# Patient Record
Sex: MALE | Race: WHITE | Employment: UNEMPLOYED | URBAN - METROPOLITAN AREA
[De-identification: names, ages, dates, MRNs, and addresses within clinical notes are randomized per-mention and may not be internally consistent; named-entity substitution may affect disease eponyms.]

---

## 2017-02-11 RX ORDER — TRAZODONE HYDROCHLORIDE 50 MG/1
TABLET ORAL
Qty: 135 TAB | Refills: 3 | Status: SHIPPED | OUTPATIENT
Start: 2017-02-11 | End: 2018-01-29 | Stop reason: SDUPTHER

## 2017-06-17 DIAGNOSIS — I10 BENIGN ESSENTIAL HYPERTENSION: ICD-10-CM

## 2017-06-17 RX ORDER — METOPROLOL SUCCINATE 100 MG/1
TABLET, EXTENDED RELEASE ORAL
Qty: 135 TAB | Refills: 3 | Status: SHIPPED | OUTPATIENT
Start: 2017-06-17 | End: 2018-06-30 | Stop reason: SDUPTHER

## 2017-08-21 ENCOUNTER — OFFICE VISIT (OUTPATIENT)
Dept: FAMILY MEDICINE CLINIC | Age: 57
End: 2017-08-21

## 2017-08-21 VITALS
HEIGHT: 73 IN | HEART RATE: 90 BPM | OXYGEN SATURATION: 97 % | DIASTOLIC BLOOD PRESSURE: 82 MMHG | WEIGHT: 228 LBS | BODY MASS INDEX: 30.22 KG/M2 | TEMPERATURE: 99.3 F | RESPIRATION RATE: 18 BRPM | SYSTOLIC BLOOD PRESSURE: 132 MMHG

## 2017-08-21 DIAGNOSIS — Z12.5 SCREENING PSA (PROSTATE SPECIFIC ANTIGEN): ICD-10-CM

## 2017-08-21 DIAGNOSIS — Z51.81 ENCOUNTER FOR MEDICATION MONITORING: ICD-10-CM

## 2017-08-21 DIAGNOSIS — I10 BENIGN ESSENTIAL HYPERTENSION: Primary | ICD-10-CM

## 2017-08-21 DIAGNOSIS — E78.00 HYPERCHOLESTEROLEMIA: ICD-10-CM

## 2017-08-21 DIAGNOSIS — Z79.899 CONTROLLED SUBSTANCE AGREEMENT SIGNED: ICD-10-CM

## 2017-08-21 DIAGNOSIS — R73.03 PRE-DIABETES: ICD-10-CM

## 2017-08-21 DIAGNOSIS — F41.9 ANXIETY: ICD-10-CM

## 2017-08-21 DIAGNOSIS — R25.1 TREMOR: ICD-10-CM

## 2017-08-21 RX ORDER — CLONAZEPAM 0.5 MG/1
TABLET ORAL
Qty: 30 TAB | Refills: 5 | Status: SHIPPED | OUTPATIENT
Start: 2017-08-21 | End: 2018-05-29 | Stop reason: SDUPTHER

## 2017-08-21 RX ORDER — POLYETHYLENE GLYCOL-3350 AND ELECTROLYTES WITH FLAVOR PACK 240; 5.84; 2.98; 6.72; 22.72 G/278.26G; G/278.26G; G/278.26G; G/278.26G; G/278.26G
POWDER, FOR SOLUTION ORAL
Refills: 0 | COMMUNITY
Start: 2017-08-10 | End: 2017-08-21

## 2017-08-21 NOTE — PATIENT INSTRUCTIONS
Prediabetes: Care Instructions  Your Care Instructions  Prediabetes is a warning sign that you are at risk for getting type 2 diabetes. It means that your blood sugar is higher than it should be. The food you eat turns into sugar, which your body uses for energy. Normally, an organ called the pancreas makes insulin, which allows the sugar in your blood to get into your body's cells. But when your body can't use insulin the right way, the sugar doesn't move into cells. It stays in your blood instead. This is called insulin resistance. The buildup of sugar in the blood causes prediabetes. The good news is that lifestyle changes may help you get your blood sugar back to normal and help you avoid or delay diabetes. Follow-up care is a key part of your treatment and safety. Be sure to make and go to all appointments, and call your doctor if you are having problems. It's also a good idea to know your test results and keep a list of the medicines you take. How can you care for yourself at home? · Watch your weight. A healthy weight helps your body use insulin properly. · Limit the amount of calories, sweets, and unhealthy fat you eat. Ask your doctor if you should see a dietitian. A registered dietitian can help you create meal plans that fit your lifestyle. · Get at least 30 minutes of exercise on most days of the week. Exercise helps control your blood sugar. It also helps you maintain a healthy weight. Walking is a good choice. You also may want to do other activities, such as running, swimming, cycling, or playing tennis or team sports. · Do not smoke. Smoking can make prediabetes worse. If you need help quitting, talk to your doctor about stop-smoking programs and medicines. These can increase your chances of quitting for good. · If your doctor prescribed medicines, take them exactly as prescribed. Call your doctor if you think you are having a problem with your medicine.  You will get more details on the specific medicines your doctor prescribes. When should you call for help? Watch closely for changes in your health, and be sure to contact your doctor if:  · You have any symptoms of diabetes. These may include:  ¨ Being thirsty more often. ¨ Urinating more. ¨ Being hungrier. ¨ Losing weight. ¨ Being very tired. ¨ Having blurry vision. · You have a wound that will not heal.  · You have an infection that will not go away. · You have problems with your blood pressure. · You want more information about diabetes and how you can keep from getting it. Where can you learn more? Go to http://santos-rob.info/. Enter I222 in the search box to learn more about \"Prediabetes: Care Instructions. \"  Current as of: March 13, 2017  Content Version: 11.3  © 8183-6030 Healthwise, Incorporated. Care instructions adapted under license by [employer] (which disclaims liability or warranty for this information). If you have questions about a medical condition or this instruction, always ask your healthcare professional. [address] any warranty or liability for your use of this information.

## 2017-08-21 NOTE — MR AVS SNAPSHOT
Visit Information Date & Time Provider Department Dept. Phone Encounter #  
 8/21/2017  8:00 AM Jose Davis, [address] [phone] [id number] Upcoming Health Maintenance Date Due INFLUENZA AGE 9 TO ADULT 8/1/2017 COLONOSCOPY 3/25/2020 DTaP/Tdap/Td series (2 - Td) 7/22/2026 Allergies as of 8/21/2017  Review Complete On: 8/21/2017 By: Jose Davis MD  
 No Known Allergies Current Immunizations  Reviewed on 8/2/2016 Name Date  
 TD Vaccine 1/1/2007 Tdap 7/22/2016 Not reviewed this visit You Were Diagnosed With   
  
 Codes Comments Benign essential hypertension    -  Primary ICD-10-CM: I10 
ICD-9-CM: 401.1 Hypercholesterolemia     ICD-10-CM: E78.00 ICD-9-CM: 272.0 Pre-diabetes     ICD-10-CM: R73.03 
ICD-9-CM: 790.29 Anxiety     ICD-10-CM: F41.9 ICD-9-CM: 300.00 Tremor     ICD-10-CM: R25.1 ICD-9-CM: 449. 0 Vitals BP Pulse Temp Resp Height(growth percentile) Weight(growth percentile) 132/82 (BP 1 Location: Right arm, BP Patient Position: Sitting) 90 99.3 °F (37.4 °C) (Oral) 18 6' 1\" (1.854 m) 228 lb (103.4 kg) SpO2 BMI Smoking Status 97% 30.08 kg/m2 Former Smoker BMI and BSA Data Body Mass Index Body Surface Area 30.08 kg/m 2 2.31 m 2 Preferred Pharmacy Pharmacy Name Phone [phone] [address] [phone] Your Updated Medication List  
  
   
This list is accurate as of: 8/21/17  8:49 AM.  Always use your most recent med list. amLODIPine 10 mg tablet Commonly known as:  Tommie Matar TAKE 1 TABLET BY MOUTH EVERY DAY  
  
 clonazePAM 0.5 mg tablet Commonly known as:  KlonoPIN  
TAKE 1 TABLET AT BEDTIME AS NEEDED  
  
 lisinopril-hydroCHLOROthiazide 20-25 mg per tablet Commonly known as:  PRINZIDE, ZESTORETIC  
TAKE 2 TABLETS BY MOUTH EVERY DAY  
  
 metoprolol succinate 100 mg tablet Commonly known as:  TOPROL-XL  
TAKE 1 AND 1/2 TABLETS BY MOUTH EVERY DAY  
  
 MULTIVITAMIN PO Take  by mouth daily. traZODone 50 mg tablet Commonly known as:  DESYREL  
TAKE 1-1 AND 1/2 TABLETS BY MOUTH NIGHTLY Prescriptions Printed Refills  
 clonazePAM (KLONOPIN) 0.5 mg tablet 5 Sig: TAKE 1 TABLET AT BEDTIME AS NEEDED Class: Print Patient Instructions Prediabetes: Care Instructions Your Care Instructions Prediabetes is a warning sign that you are at risk for getting type 2 diabetes. It means that your blood sugar is higher than it should be. The food you eat turns into sugar, which your body uses for energy. Normally, an organ called the pancreas makes insulin, which allows the sugar in your blood to get into your body's cells. But when your body can't use insulin the right way, the sugar doesn't move into cells. It stays in your blood instead. This is called insulin resistance. The buildup of sugar in the blood causes prediabetes. The good news is that lifestyle changes may help you get your blood sugar back to normal and help you avoid or delay diabetes. Follow-up care is a key part of your treatment and safety. Be sure to make and go to all appointments, and call your doctor if you are having problems. It's also a good idea to know your test results and keep a list of the medicines you take. How can you care for yourself at home? · Watch your weight. A healthy weight helps your body use insulin properly. · Limit the amount of calories, sweets, and unhealthy fat you eat. Ask your doctor if you should see a dietitian. A registered dietitian can help you create meal plans that fit your lifestyle. · Get at least 30 minutes of exercise on most days of the week. Exercise helps control your blood sugar. It also helps you maintain a healthy weight. Walking is a good choice.  You also may want to do other activities, such as running, swimming, cycling, or playing tennis or team sports. · Do not smoke. Smoking can make prediabetes worse. If you need help quitting, talk to your doctor about stop-smoking programs and medicines. These can increase your chances of quitting for good. · If your doctor prescribed medicines, take them exactly as prescribed. Call your doctor if you think you are having a problem with your medicine. You will get more details on the specific medicines your doctor prescribes. When should you call for help? Watch closely for changes in your health, and be sure to contact your doctor if: 
· You have any symptoms of diabetes. These may include: ¨ Being thirsty more often. ¨ Urinating more. ¨ Being hungrier. ¨ Losing weight. ¨ Being very tired. ¨ Having blurry vision. · You have a wound that will not heal. 
· You have an infection that will not go away. · You have problems with your blood pressure. · You want more information about diabetes and how you can keep from getting it. Where can you learn more? Go to http://santos-rob.info/. Enter I222 in the search box to learn more about \"Prediabetes: Care Instructions. \" Current as of: March 13, 2017 Content Version: 11.3 © 2575-7820 [employer], Incorporated. Care instructions adapted under license by [employer] (which disclaims liability or warranty for this information). If you have questions about a medical condition or this instruction, always ask your healthcare professional. [address] any warranty or liability for your use of this information. Introducing [facility]! Dear Adalgisa Repress: Thank you for requesting a [employer] account. Our records indicate that you already have an active [employer] account. You can access your account anytime at [email or web address]. Major Aide/[employer] Did you know that you can access your hospital and ER discharge instructions at any time in [employer]? You can also review all of your test results from your hospital stay or ER visit. Additional Information If you have questions, please visit the Frequently Asked Questions section of the [employer] website at [email or web address] Remember, [employer] is NOT to be used for urgent needs. For medical emergencies, dial 911. Now available from your iPhone and Android! Please provide this summary of care documentation to your next provider. Your primary care clinician is listed as SHAWN BRANDT. If you have any questions after today's visit, please call [phone].

## 2017-08-21 NOTE — PROGRESS NOTES
Chief Complaint   Patient presents with    Hypertension     fasting follow up    Diabetes    Cholesterol Problem     1. Have you been to the ER, urgent care clinic since your last visit? Hospitalized since your last visit? No    2. Have you seen or consulted any other health care providers outside of the [facility] since your last visit? Include any pap smears or colon screening.    Dentist Dr Malia Escudero   Having colonoscopy in four days with [facility]

## 2017-08-21 NOTE — PROGRESS NOTES
HISTORY OF PRESENT ILLNESS   HPI  Fasting follow up hypercholesterolemia, hypertension, prediabetes, labs and medication check. Overall has been getting along well and feeling well in general  Tolerating current medication regimen w/o reaction or side effects. Takes Trazodone qhs for sleep which does help most nights. Some nights he still struggles w/ it. He takes Klonopin 1 tablet ~ 3-4 days a week for anxiety or sleep. He vacationed in [place] w/ his family this summer for a few weeks in June/early July. States he was constantly on the go and sometimes would go long stents w/o eating or drinking. He fainted twice while w/ his family. He didn't seek medical attention and has felt perfectly fine since. REVIEW OF SYMPTOMS     Review of Systems   Constitutional: Negative. Respiratory: Negative. Cardiovascular: Negative. Gastrointestinal: Negative.     Neurological: Negative for dizziness.         PROBLEM LIST/MEDICAL HISTORY      Problem List  Date Reviewed: 8/21/2017          Codes Class Noted    Controlled substance agreement signed ICD-10-CM: Z79.899  ICD-9-CM: V58.69  8/21/2017    Overview Signed 8/21/2017  8:52 AM by Chidi Orlando MD     8-21-17             Benign essential hypertension ICD-10-CM: I10  ICD-9-CM: 401.1  10/20/2016        Hypercholesterolemia ICD-10-CM: E78.00  ICD-9-CM: 272.0  10/20/2016        Pre-diabetes ICD-10-CM: R73.03  ICD-9-CM: 790.29  7/7/2013    Overview Signed 7/7/2013 10:54 PM by Chidi Orlando MD     2009             Left Plantar fasciitis ICD-10-CM: M72.2  ICD-9-CM: 728.71  7/20/2012    Overview Signed 7/20/2012 11:57 AM by Chidi Orlando MD     [facility], Ortho ~ 2006; 4/2012             White coat syndrome ICD-10-CM: [id number]  ICD-9-CM: [place]  7/20/2012        Elevated liver enzymes ICD-10-CM: R74.8  ICD-9-CM: 790.5  7/20/2012    Overview Signed 7/20/2012  2:09 PM by Chidi Orlando MD     R/t alcohol             Sinus tachycardia ICD-10-CM: R00.0  ICD-9-CM: 427.89  9/19/2011        Rectal polyp & Internal Hemorrhoids 2010 ICD-10-CM: K62.1  ICD-9-CM: 569.0  11/18/2010    Overview Signed 11/18/2010  1:25 PM by Zak Mckeon MD     Colonoscopy 3/2010 Dr Sharifa Leyva; f/u 5 yrs             Alcoholism [facility]) ICD-10-CM: F10.20  ICD-9-CM: 303.90  Unknown    Overview Addendum 7/20/2012 12:19 PM by Zak Mckeon MD     [facility] clinical trials             Anxiety ICD-10-CM: F41.9  ICD-9-CM: 300.00  Unknown        Tremor ICD-10-CM: R25.1  ICD-9-CM: 781.0  Unknown        Concentric LVH (left ventricular hypertrophy) ICD-10-CM: I51.7  ICD-9-CM: 429.3  Unknown    Overview Addendum 11/18/2010  1:23 PM by Zak Mckeon MD     May 2008 EF 60%; Stress tests normal 1997, 2003             H/O Hepatitis C, s/p Tx ICD-10-CM: B19.20  ICD-9-CM: 070.70  Unknown    Overview Addendum 7/20/2012  2:08 PM by Zak Mckeon MD     1997; s/p Interferon and Shira Dereck; Dr Sharifa Leyva; Liver biopsy 1998 for staging             Genital HSV ICD-10-CM: A60.00  ICD-9-CM: 054.10  4/1/2010    Overview Signed 11/18/2010  1:22 PM by Zak Mckeon MD     1988             Situational stress ICD-10-CM: F43.9  ICD-9-CM: V62.89  4/1/2010    Overview Signed 11/18/2010  1:23 PM by Zak Mckeon MD     2005             S/P colonoscopy with polypectomy ICD-10-CM: Z46.806  ICD-9-CM: V45.89  3/15/2010    Overview Signed 4/20/2010  9:47 PM by Zak Mckeon MD     Rectal polyp & internal Hemorrhoids; Dr Sharifa Leyva; ok x 5yrs                       PAST SURGICAL HISTORY       Past Surgical History:   Procedure Laterality Date    COLONOSCOPY  3/2010    -rectal polyp and intern hemorr-f/u in 5 years    [facility]    HX GI  3/1998    Liver biopsy, Dr Cristina Montero  childhood    BILATERAL     HX ORTHOPAEDIC  1/24/14    Edwina Reese resulting in Left hip and pelvic fracture, repair w/ screws;   [facility] 2  2003    STRESS TEST CARDIAC  2003    Negative ETT         MEDICATIONS      Current Outpatient Prescriptions   Medication Sig    clonazePAM (KLONOPIN) 0.5 mg tablet TAKE 1 TABLET AT BEDTIME AS NEEDED    metoprolol succinate (TOPROL-XL) 100 mg tablet TAKE 1 AND 1/2 TABLETS BY MOUTH EVERY DAY    traZODone (DESYREL) 50 mg tablet TAKE 1-1 AND 1/2 TABLETS BY MOUTH NIGHTLY    amLODIPine (NORVASC) 10 mg tablet TAKE 1 TABLET BY MOUTH EVERY DAY    lisinopril-hydrochlorothiazide (PRINZIDE, ZESTORETIC) 20-25 mg per tablet TAKE 2 TABLETS BY MOUTH EVERY DAY    MULTIVITAMINS (MULTIVITAMIN PO) Take  by mouth daily. No current facility-administered medications for this visit.            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     Laid off [employer] more recently    Chandan Rivera; : Started new job at [employer]      Social History Main Topics    Smoking status: Former Smoker     Packs/day: 0.50     Years: 10.00     Types: Cigarettes     Quit date: 1/1/2004    Smokeless tobacco: Never Used    Alcohol use 22.5 oz/week     45 Cans of beer per week      Comment: 6-12 cans a beer a day at present; chronic alcoholism, has participated [facility] clinical trials [facility]; relapses freq; ranges from 2 cans to 12-15 beers a day, when heavier can get up to 20beers in a day; still battling w/ this    Drug use: No    Sexual activity: Yes     Partners: Female     Other Topics Concern    Caffeine Concern No     rare, just an occ diet coke    Stress Concern No    Weight Concern No    Special Diet Yes     low sodium, low fat, lots of salads, avoids fried foods    Exercise No     just trying to stay active     Social History Narrative    IMMUNIZATIONS  Immunization History   Administered Date(s) Administered    TD Vaccine 01/01/2007    Tdap 07/22/2016         FAMILY HISTORY     Family History   Problem Relation Age of Onset    Diabetes Paternal Grandmother     Heart Disease Paternal Grandfather     Hypertension Brother 50    Colon Polyps Mother 78    Breast Cancer Maternal Aunt          VITALS     Visit Vitals    /82 (BP 1 Location: Right arm, BP Patient Position: Sitting)    Pulse 90    Temp 99.3 °F (37.4 °C) (Oral)    Resp 18    Ht 6' 1\" (1.854 m)    Wt 228 lb (103.4 kg)    SpO2 97%    BMI 30.08 kg/m2          PHYSICAL EXAMINATION     Physical Exam   Constitutional: He is oriented to person, place, and time. No distress. Neck: Neck supple. No JVD present. Carotid bruit is not present. No thyromegaly present. Cardiovascular: Normal rate, regular rhythm and normal heart sounds. Pulmonary/Chest: Effort normal and breath sounds normal.   Abdominal: Soft. Bowel sounds are normal. There is no tenderness. Musculoskeletal: He exhibits no edema. Lymphadenopathy:     He has no cervical adenopathy. Neurological: He is alert and oriented to person, place, and time. Coordination normal.   Skin: Skin is warm. Psychiatric: Mood, affect and judgment normal.   Vitals reviewed.              ASSESSMENT & PLAN       ICD-10-CM ICD-9-CM    1. Benign essential hypertension I10 401.1 CBC WITH AUTOMATED DIFF      METABOLIC PANEL, COMPREHENSIVE      TSH 3RD GENERATION      HEMOGLOBIN A1C WITH EAG      URINALYSIS W/ RFLX MICROSCOPIC   2. Hypercholesterolemia E78.00 272.0 LIPID PANEL      TSH 3RD GENERATION   3. Pre-diabetes Z13.45 037.16 METABOLIC PANEL, COMPREHENSIVE      HEMOGLOBIN A1C WITH EAG   4. Anxiety F41.9 300.00 clonazePAM (KLONOPIN) 0.5 mg tablet   5. Tremor R25.1 781.0 clonazePAM (KLONOPIN) 0.5 mg tablet   6. Encounter for medication monitoring Z51.81 V58.83    7.  Controlled substance agreement signed Z79.899 V58.69 8. Screening PSA (prostate specific antigen) Z12.5 V76.44 PSA SCREENING     Fasting labs today    Reviewed diet, nutrition, exercise and weight control    Cardiovascular risk and specific lipid/LDL goals reviewed    Reviewed medications and side effects in detail      pulled and reviewed. No problems noted. Klonopin last filled 4-10-17 #30. Low abuse/misuse potential. Patient counseled and we discussed controlled medications, effects, side effects, indications, risks vs benefits, precautions, potential interactions/dangers w/ other medications, abuse potential and the possible negative health implications/risks associated w/ overuse/abuse/misuse of controlled stimulant or sedating medications including overdose and death. Patient expressed understanding and agreement with current plan of care. Controlled Substance Agreement reviewed, signed, copy given, copy filed to scan. Further follow up & other recommendations pending review of labs as well    Otherwise fasting follow up 6months for controlled med check.  Follow up sooner prn

## 2017-08-22 LAB
ALBUMIN SERPL-MCNC: 4.3 G/DL (ref 3.5–5.5)
ALBUMIN/GLOB SERPL: 1.5 {RATIO} (ref 1.2–2.2)
ALP SERPL-CCNC: 82 IU/L (ref 39–117)
ALT SERPL-CCNC: 33 IU/L (ref 0–44)
AST SERPL-CCNC: 46 IU/L (ref 0–40)
BASOPHILS # BLD AUTO: 0 X10E3/UL (ref 0–0.2)
BASOPHILS NFR BLD AUTO: 0 %
BILIRUB SERPL-MCNC: 0.5 MG/DL (ref 0–1.2)
BUN SERPL-MCNC: 6 MG/DL (ref 6–24)
BUN/CREAT SERPL: 8 (ref 9–20)
CALCIUM SERPL-MCNC: 9.4 MG/DL (ref 8.7–10.2)
CHLORIDE SERPL-SCNC: 95 MMOL/L (ref 96–106)
CHOLEST SERPL-MCNC: 158 MG/DL (ref 100–199)
CO2 SERPL-SCNC: 22 MMOL/L (ref 18–29)
CREAT SERPL-MCNC: 0.71 MG/DL (ref 0.76–1.27)
EOSINOPHIL # BLD AUTO: 0 X10E3/UL (ref 0–0.4)
EOSINOPHIL NFR BLD AUTO: 0 %
ERYTHROCYTE [DISTWIDTH] IN BLOOD BY AUTOMATED COUNT: 14 % (ref 12.3–15.4)
EST. AVERAGE GLUCOSE BLD GHB EST-MCNC: 117 MG/DL
GLOBULIN SER CALC-MCNC: 2.8 G/DL (ref 1.5–4.5)
GLUCOSE SERPL-MCNC: 103 MG/DL (ref 65–99)
HBA1C MFR BLD: 5.7 % (ref 4.8–5.6)
HCT VFR BLD AUTO: 45.9 % (ref 37.5–51)
HDLC SERPL-MCNC: 75 MG/DL
HGB BLD-MCNC: 15.6 G/DL (ref 12.6–17.7)
IMM GRANULOCYTES # BLD: 0 X10E3/UL (ref 0–0.1)
IMM GRANULOCYTES NFR BLD: 0 %
INTERPRETATION, 910389: NORMAL
LDLC SERPL CALC-MCNC: 75 MG/DL (ref 0–99)
LYMPHOCYTES # BLD AUTO: 1 X10E3/UL (ref 0.7–3.1)
LYMPHOCYTES NFR BLD AUTO: 13 %
MCH RBC QN AUTO: 29.9 PG (ref 26.6–33)
MCHC RBC AUTO-ENTMCNC: 34 G/DL (ref 31.5–35.7)
MCV RBC AUTO: 88 FL (ref 79–97)
MONOCYTES # BLD AUTO: 1.3 X10E3/UL (ref 0.1–0.9)
MONOCYTES NFR BLD AUTO: 17 %
NEUTROPHILS # BLD AUTO: 5.3 X10E3/UL (ref 1.4–7)
NEUTROPHILS NFR BLD AUTO: 70 %
PLATELET # BLD AUTO: 263 X10E3/UL (ref 150–379)
POTASSIUM SERPL-SCNC: 4.3 MMOL/L (ref 3.5–5.2)
PROT SERPL-MCNC: 7.1 G/DL (ref 6–8.5)
RBC # BLD AUTO: 5.21 X10E6/UL (ref 4.14–5.8)
SODIUM SERPL-SCNC: 136 MMOL/L (ref 134–144)
TRIGL SERPL-MCNC: 38 MG/DL (ref 0–149)
TSH SERPL DL<=0.005 MIU/L-ACNC: 1.76 UIU/ML (ref 0.45–4.5)
VLDLC SERPL CALC-MCNC: 8 MG/DL (ref 5–40)
WBC # BLD AUTO: 7.7 X10E3/UL (ref 3.4–10.8)

## 2017-08-24 LAB — COLONOSCOPY, EXTERNAL: NORMAL

## 2017-09-10 ENCOUNTER — TELEPHONE (OUTPATIENT)
Dept: FAMILY MEDICINE CLINIC | Age: 57
End: 2017-09-10

## 2017-09-10 DIAGNOSIS — Z12.5 SCREENING PSA (PROSTATE SPECIFIC ANTIGEN): ICD-10-CM

## 2017-09-10 DIAGNOSIS — D72.828 HIGH BLOOD MONOCYTE COUNT: ICD-10-CM

## 2017-09-10 DIAGNOSIS — I10 BENIGN ESSENTIAL HYPERTENSION: Primary | ICD-10-CM

## 2017-09-11 NOTE — TELEPHONE ENCOUNTER
PI of results. He states understanding. He states that he will come this week for the labs. He does report that he had a colonoscopy a couple of weeks ago and that biopsy came back positive and he is going for a CT scan today. Advised that we haven't gotten anything yet from GI Dr Connie Wong.

## 2017-09-11 NOTE — TELEPHONE ENCOUNTER
Lipids improved and excellent  Fasting BS improved and HgbA1c good and stable  Kidney, thyroid and electrolytes normal  Only one liver enzyme mildly elevated but is stable and in his baseline range  Blood counts all good and normal except monocyte count mildly elevated  Will repeat w/ next labs. Urine and PSA not done. Have pt RTC for NON fasting labs, no appt needed. All orders pended. Otherwise things are looking good overall. Further recs pending these repeat/follow up labs. If stable, just needs 6month controlled med check on his Klonopin but would not need labs at that appt.

## 2017-09-12 DIAGNOSIS — Z12.5 SCREENING PSA (PROSTATE SPECIFIC ANTIGEN): ICD-10-CM

## 2017-09-12 DIAGNOSIS — I10 BENIGN ESSENTIAL HYPERTENSION: ICD-10-CM

## 2017-09-12 DIAGNOSIS — D72.828 HIGH BLOOD MONOCYTE COUNT: ICD-10-CM

## 2017-09-13 LAB
APPEARANCE UR: CLEAR
BASOPHILS # BLD AUTO: 0 X10E3/UL (ref 0–0.2)
BASOPHILS NFR BLD AUTO: 0 %
BILIRUB UR QL STRIP: NEGATIVE
COLOR UR: YELLOW
EOSINOPHIL # BLD AUTO: 0.1 X10E3/UL (ref 0–0.4)
EOSINOPHIL NFR BLD AUTO: 1 %
ERYTHROCYTE [DISTWIDTH] IN BLOOD BY AUTOMATED COUNT: 14.2 % (ref 12.3–15.4)
GLUCOSE UR QL: NEGATIVE
HCT VFR BLD AUTO: 43.4 % (ref 37.5–51)
HGB BLD-MCNC: 15 G/DL (ref 12.6–17.7)
HGB UR QL STRIP: NEGATIVE
IMM GRANULOCYTES # BLD: 0 X10E3/UL (ref 0–0.1)
IMM GRANULOCYTES NFR BLD: 0 %
KETONES UR QL STRIP: NEGATIVE
LEUKOCYTE ESTERASE UR QL STRIP: NEGATIVE
LYMPHOCYTES # BLD AUTO: 1.5 X10E3/UL (ref 0.7–3.1)
LYMPHOCYTES NFR BLD AUTO: 14 %
MCH RBC QN AUTO: 29.6 PG (ref 26.6–33)
MCHC RBC AUTO-ENTMCNC: 34.6 G/DL (ref 31.5–35.7)
MCV RBC AUTO: 86 FL (ref 79–97)
MICRO URNS: NORMAL
MONOCYTES # BLD AUTO: 1.5 X10E3/UL (ref 0.1–0.9)
MONOCYTES NFR BLD AUTO: 15 %
NEUTROPHILS # BLD AUTO: 7.4 X10E3/UL (ref 1.4–7)
NEUTROPHILS NFR BLD AUTO: 70 %
NITRITE UR QL STRIP: NEGATIVE
PH UR STRIP: 7.5 [PH] (ref 5–7.5)
PLATELET # BLD AUTO: 283 X10E3/UL (ref 150–379)
PROT UR QL STRIP: NEGATIVE
PSA SERPL-MCNC: 1 NG/ML (ref 0–4)
RBC # BLD AUTO: 5.06 X10E6/UL (ref 4.14–5.8)
REFLEX CRITERIA: NORMAL
SP GR UR: 1.01 (ref 1–1.03)
UROBILINOGEN UR STRIP-MCNC: 0.2 MG/DL (ref 0.2–1)
WBC # BLD AUTO: 10.5 X10E3/UL (ref 3.4–10.8)

## 2017-09-21 ENCOUNTER — TELEPHONE (OUTPATIENT)
Dept: FAMILY MEDICINE CLINIC | Age: 57
End: 2017-09-21

## 2017-09-21 NOTE — TELEPHONE ENCOUNTER
[address] [facility] -  [phone]    -  Need medication list faxed  [phone]

## 2017-10-01 ENCOUNTER — TELEPHONE (OUTPATIENT)
Dept: FAMILY MEDICINE CLINIC | Age: 57
End: 2017-10-01

## 2017-10-01 DIAGNOSIS — D72.821 MONOCYTOSIS: Primary | ICD-10-CM

## 2017-10-01 DIAGNOSIS — D72.9 NEUTROPHILIC LEUKOCYTOSIS: ICD-10-CM

## 2017-10-01 NOTE — TELEPHONE ENCOUNTER
Urine normal  PSA wnl and stable  Follow up CBC shows normal total white count but neutrophils and monocytes are both now elevated. Cause unclear but monocyte count is steadily increasing w/ each check. Would rather have this evaluated further sooner rather than later. Will arrange Hematology consult for their evaluation and input at this time   I have placed order for our [facility] group. Otherwise can just see me for next follow up/med check in 6months, set now, doesn't not need to fast for that visit.

## 2017-10-02 PROBLEM — C20 RECTAL CANCER (HCC): Status: ACTIVE | Noted: 2017-10-02

## 2017-10-02 NOTE — TELEPHONE ENCOUNTER
Called pt with results. He states that he had colonoscopy done and has been Dx with colon CA. He is seeing oncologist Dr Shante Carroll and is getting a port placed tomorrow. He has had a lot of blood work and CT's done. Advised that I will let Dr Brynn Brown know and asked him to keep us posted.

## 2017-11-12 DIAGNOSIS — I10 BENIGN ESSENTIAL HYPERTENSION: ICD-10-CM

## 2017-11-12 RX ORDER — AMLODIPINE BESYLATE 10 MG/1
TABLET ORAL
Qty: 90 TAB | Refills: 0 | Status: SHIPPED | OUTPATIENT
Start: 2017-11-12 | End: 2018-02-06

## 2017-11-12 RX ORDER — LISINOPRIL AND HYDROCHLOROTHIAZIDE 20; 25 MG/1; MG/1
TABLET ORAL
Qty: 180 TAB | Refills: 0 | Status: SHIPPED | OUTPATIENT
Start: 2017-11-12 | End: 2018-01-11 | Stop reason: SDUPTHER

## 2017-11-12 NOTE — TELEPHONE ENCOUNTER
Remind pt needs to have 6month controlled med check due ~ 2-2018 and should go ahead and get that on the books at this time.

## 2018-01-16 ENCOUNTER — HOSPITAL ENCOUNTER (OUTPATIENT)
Dept: GENERAL RADIOLOGY | Age: 58
Discharge: HOME | End: 2018-01-16
Attending: INTERNAL MEDICINE
Payer: COMMERCIAL

## 2018-01-16 DIAGNOSIS — C20 MALIGNANT NEOPLASM OF RECTUM (HCC): ICD-10-CM

## 2018-01-16 PROCEDURE — 71046 X-RAY EXAM CHEST 2 VIEWS: CPT

## 2018-01-23 ENCOUNTER — HOSPITAL ENCOUNTER (OUTPATIENT)
Dept: GENERAL RADIOLOGY | Age: 58
Discharge: HOME | End: 2018-01-23
Attending: NURSE PRACTITIONER
Payer: COMMERCIAL

## 2018-01-23 DIAGNOSIS — C20 MALIGNANT NEOPLASM OF RECTUM (HCC): ICD-10-CM

## 2018-01-23 PROCEDURE — 71046 X-RAY EXAM CHEST 2 VIEWS: CPT

## 2018-01-29 RX ORDER — TRAZODONE HYDROCHLORIDE 50 MG/1
TABLET ORAL
Qty: 135 TAB | Refills: 3 | Status: SHIPPED | OUTPATIENT
Start: 2018-01-29 | End: 2018-10-10 | Stop reason: SDUPTHER

## 2018-02-06 ENCOUNTER — OFFICE VISIT (OUTPATIENT)
Dept: FAMILY MEDICINE CLINIC | Age: 58
End: 2018-02-06

## 2018-02-06 VITALS
WEIGHT: 232 LBS | SYSTOLIC BLOOD PRESSURE: 132 MMHG | OXYGEN SATURATION: 98 % | HEIGHT: 73 IN | BODY MASS INDEX: 30.75 KG/M2 | TEMPERATURE: 97.9 F | HEART RATE: 80 BPM | RESPIRATION RATE: 18 BRPM | DIASTOLIC BLOOD PRESSURE: 78 MMHG

## 2018-02-06 DIAGNOSIS — C78.7 ADENOCARCINOMA OF RECTUM METASTATIC TO LIVER (HCC): ICD-10-CM

## 2018-02-06 DIAGNOSIS — R25.1 TREMOR: ICD-10-CM

## 2018-02-06 DIAGNOSIS — Z51.81 ENCOUNTER FOR MEDICATION MONITORING: ICD-10-CM

## 2018-02-06 DIAGNOSIS — I10 BENIGN ESSENTIAL HYPERTENSION: ICD-10-CM

## 2018-02-06 DIAGNOSIS — C20 RECTAL CANCER (HCC): ICD-10-CM

## 2018-02-06 DIAGNOSIS — F41.9 ANXIETY: Primary | ICD-10-CM

## 2018-02-06 DIAGNOSIS — F10.20 ALCOHOLISM (HCC): ICD-10-CM

## 2018-02-06 DIAGNOSIS — C20 ADENOCARCINOMA OF RECTUM METASTATIC TO LIVER (HCC): ICD-10-CM

## 2018-02-06 RX ORDER — PROCHLORPERAZINE MALEATE 10 MG
TABLET ORAL
Refills: 3 | COMMUNITY
Start: 2017-12-09 | End: 2018-09-20

## 2018-02-06 RX ORDER — POTASSIUM CHLORIDE 1500 MG/1
TABLET, FILM COATED, EXTENDED RELEASE ORAL
Refills: 3 | COMMUNITY
Start: 2018-01-11 | End: 2018-09-20

## 2018-02-06 RX ORDER — LEVOFLOXACIN 500 MG/1
TABLET, FILM COATED ORAL
Refills: 0 | COMMUNITY
Start: 2018-01-23 | End: 2018-02-06

## 2018-02-06 RX ORDER — PSEUDOEPHEDRINE HCL 30 MG
30 TABLET ORAL DAILY
COMMUNITY
End: 2020-12-15 | Stop reason: ALTCHOICE

## 2018-02-06 RX ORDER — CAPECITABINE 150 MG/1
TABLET, FILM COATED ORAL
Refills: 5 | COMMUNITY
Start: 2018-01-09 | End: 2020-12-15 | Stop reason: ALTCHOICE

## 2018-02-06 RX ORDER — ALBUTEROL SULFATE 90 UG/1
AEROSOL, METERED RESPIRATORY (INHALATION)
Refills: 0 | COMMUNITY
Start: 2018-01-09 | End: 2018-09-20

## 2018-02-06 RX ORDER — CAPECITABINE 500 MG/1
TABLET, FILM COATED ORAL
Refills: 5 | COMMUNITY
Start: 2018-01-09 | End: 2020-12-15 | Stop reason: ALTCHOICE

## 2018-02-06 NOTE — PROGRESS NOTES
HISTORY OF PRESENT ILLNESS   HPI  6month follow up controlled medication check on Klonopin prn anxiety. Interim hx significant in that pt dx w/ adenocarcinoma of rectum and has been getting chemo since ~ . He developed the flu in , became dehydrated. K+ and BP's were running low in the 90/60's. Oncology stopped his Norvasc, continued his other BP meds and added K+. His BP's have been improved and in the 120-130/70 range since then. He developed pneumonia last month and was treated w/ Levaquin and inhalers and is doing better. His appetite is pretty good and he denies N/V/D/C. States his BM's are finally back to normal.  He takes Trazodone qhs and Klonopin ` tablet ~ 3 nights a week prn anxiety related insomnia and that continues to work really well, sam now under his current circumstances. Feels he is coping well and is staying optimistic. REVIEW OF SYMPTOMS     Review of Systems   Constitutional: Negative for chills and fever. Cardiovascular: Negative for chest pain, palpitations and leg swelling. Neurological: Negative for dizziness, tremors (stable lately) and headaches. Psychiatric/Behavioral: Negative for depression.           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 [facility]) ICD-10-CM: C20, C78.7  ICD-9-CM: 154.1, 197.7  2/6/2018    Overview Signed 2/6/2018  8:46 AM by Marcelina Lyons MD     Onc Dr Lyn Steele, Chemo --=>             Rectal cancer [facility]) ICD-10-CM: C20  ICD-9-CM: 154.1  10/2/2017    Overview Signed 10/2/2017  9:49 PM by Marcelina Lyons MD     Colonoscopy: 6 cm mass in rectum, bx +; Dr Cj Aponte;  Onc Dr Salima Wayne             Controlled substance agreement signed ICD-10-CM: L08.112  ICD-9-CM: V58.69  8/21/2017    Overview Signed 8/21/2017  8:52 AM by Marcelina Lyons MD     [birth year]             Benign essential hypertension ICD-10-CM: I10  ICD-9-CM: 401.1  10/20/2016        Hypercholesterolemia ICD-10-CM: E78.00  ICD-9-CM: 272.0  10/20/2016        Pre-diabetes ICD-10-CM: R73.03  ICD-9-CM: 790.29  7/7/2013    Overview Signed 7/7/2013 10:54 PM by David Romero MD     2009             Left Plantar fasciitis ICD-10-CM: M72.2  ICD-9-CM: 728.71  7/20/2012    Overview Signed 7/20/2012 11:57 AM by David Romreo MD     [facility], Ortho ~ 2006; 4/2012             White coat syndrome ICD-10-CM: Krissy Pacheco  ICD-9-CM: Krissy Pacheco  7/20/2012        Elevated liver enzymes ICD-10-CM: R74.8  ICD-9-CM: 790.5  7/20/2012    Overview Signed 7/20/2012  2:09 PM by David Romero MD     R/t alcohol             Sinus tachycardia ICD-10-CM: R00.0  ICD-9-CM: 427.89  9/19/2011        Rectal polyp & Internal Hemorrhoids 2010 ICD-10-CM: K62.1  ICD-9-CM: 569.0  11/18/2010    Overview Signed 11/18/2010  1:25 PM by David Romero MD     Colonoscopy 3/2010 Dr Mary Ojeda; f/u 5 yrs             Alcoholism [facility]) ICD-10-CM: F10.20  ICD-9-CM: 303.90  Unknown    Overview Addendum 7/20/2012 12:19 PM by David Romero MD     [facility] clinical trials             Anxiety ICD-10-CM: F41.9  ICD-9-CM: 300.00  Unknown        Tremor ICD-10-CM: R25.1  ICD-9-CM: 781.0  Unknown        Concentric LVH (left ventricular hypertrophy) ICD-10-CM: I51.7  ICD-9-CM: 429.3  Unknown    Overview Addendum 11/18/2010  1:23 PM by David Romero MD     May 2008 EF 60%;    Stress tests normal 1997, 2003             H/O Hepatitis C, s/p Tx ICD-10-CM: B19.20  ICD-9-CM: 070.70  Unknown    Overview Addendum 7/20/2012  2:08 PM by David Romero MD     1997; s/p Interferon and Rc Isbell; Dr Mary Ojeda; Liver biopsy 1998 for staging             Genital HSV ICD-10-CM: A60.00  ICD-9-CM: 054.10  4/1/2010    Overview Signed 11/18/2010  1:22 PM by David Romero MD     1988             Situational stress ICD-10-CM: F43.9  ICD-9-CM: V62.89  4/1/2010    Overview Signed 11/18/2010  1:23 PM by Milagro Perez MD     2005             S/P colonoscopy with polypectomy ICD-10-CM: Z98.890  ICD-9-CM: V45.89  3/15/2010    Overview Signed 4/20/2010  9:47 PM by Milagro Perez MD     Rectal polyp & internal Hemorrhoids; Dr Carlisle Sensor; ok x 5yrs                       PAST SURGICAL HISTORY       Past Surgical History:   Procedure Laterality Date    COLONOSCOPY  3/2010    -rectal polyp and intern hemorr-f/u in 5 years    HX COLONOSCOPY  08/24/2017, Dr José Dye    [facility]-Moderate diverticulosis in the left side of the colon- 6mm mass in the rectum ( Biopsy)    HX GI  3/1998    Liver biopsy, Dr Sutton Come  childhood    BILATERAL     HX ORTHOPAEDIC  1/24/14    Vonita Quick resulting in Left hip and pelvic fracture, repair w/ screws; Dr Candy Anand at [address]  2003    Negative ETT         MEDICATIONS      Current Outpatient Prescriptions   Medication Sig    PROAIR HFA 90 mcg/actuation inhaler INHALE 2 PUFFS BY MOUTH EVERY 4 HOURS AS NEEDED    capecitabine (XELODA) 150 mg tablet take 2 tablets BY MOUTH with food TWICE DAILY with 4 tabs of 500mg (total DOSE 2300mg TWICE DAILY)  2 weeks on/1 week off    capecitabine (XELODA) 500 mg tablet take 4 tablets BY MOUTH with food TWICE DAILY with 2 tabs of 150mg (total DOSE 2300mg TWICE DAILY)  2 weeks on/1 week off    potassium chloride SR (K-TAB) 20 mEq tablet TAKE 1 TABLET BY MOUTH TWICE A DAY FOR 3 DAYS THEN TAKE ONE TABLET BY MOUTH DAILY    prochlorperazine (COMPAZINE) 10 mg tablet TAKE 1 TABLET BY MOUTH EVERY 6 HOURS AS NEEDED FOR NAUSEA    pseudoephedrine (SUDAFED) 30 mg tablet Take  by mouth every four (4) hours as needed for Congestion.  TAKES EVERY MORNING    traZODone (DESYREL) 50 mg tablet TAKE 1-1 AND 1/2 TABLETS BY MOUTH NIGHTLY    lisinopril-hydroCHLOROthiazide (PRINZIDE, ZESTORETIC) 20-25 mg per tablet TAKE 2 TABLETS BY MOUTH EVERY DAY    clonazePAM (KLONOPIN) 0.5 mg tablet TAKE 1 TABLET AT BEDTIME AS NEEDED    metoprolol succinate (TOPROL-XL) 100 mg tablet TAKE 1 AND 1/2 TABLETS BY MOUTH EVERY DAY    MULTIVITAMINS (MULTIVITAMIN PO) Take  by mouth daily. No current facility-administered medications for this visit.            ALLERGIES   No Known Allergies       SOCIAL HISTORY       Social History     Social History    Marital status:      Spouse name: N/A    Number of children: 2    Years of education: N/A     Occupational History    IT by trade     Laid off from [employer] after 25 yrs     Laid off [employer] more recently    Chandan Rivera; : Started new job at [employer] Smoking status: Former Smoker     Packs/day: 0.50     Years: 10.00     Types: Cigarettes     Quit date: 1/1/2004    Smokeless tobacco: Never Used    Alcohol use 22.5 oz/week     45 Cans of beer per week      Comment: 6-12 cans a beer a day at present; chronic alcoholism, has participated in clinical trials at [facility]; relapses freq; ranges from 2 cans to 12-15 beers a day, when heavier can get up to 20beers in a day; still battling w/ this    Drug use: No    Sexual activity: Yes     Partners: Female     Other Topics Concern    Caffeine Concern No     rare, just an occ diet coke    Stress Concern No    Weight Concern No    Special Diet Yes     low sodium, low fat, lots of salads, avoids fried foods    Exercise No     just trying to stay active     Social History Narrative        IMMUNIZATIONS  Immunization History   Administered Date(s) Administered    TD Vaccine 01/01/2007    Tdap 07/22/2016         FAMILY HISTORY     Family History   Problem Relation Age of Onset    Diabetes Paternal Grandmother     Heart Disease Paternal Grandfather     Hypertension Brother 50    Colon Polyps Mother 78    Breast Cancer Maternal Aunt          VITALS     Visit Vitals    /78 (BP 1 Location: Left arm, BP Patient Position: Sitting)    Pulse 80    Temp 97.9 °F (36.6 °C) (Oral)    Resp 18    Ht 6' 1\" (1.854 m)    Wt 232 lb (105.2 kg)    SpO2 98%    BMI 30.61 kg/m2          PHYSICAL EXAMINATION     Physical Exam   Constitutional: He is well-developed, well-nourished, and in no distress. Cardiovascular: Normal rate, regular rhythm and normal heart sounds. Pulmonary/Chest: Effort normal and breath sounds normal. No respiratory distress. He has no wheezes. He has no rales. Psychiatric: Mood, affect and judgment normal.   Vitals reviewed.              ASSESSMENT & PLAN   Diagnoses and all orders for this visit:    1. Anxiety, situational, controlled w/ prn Klonopin 1 tab qhs prn    2. Tremor, stable and controlled on Toprol and prn Klonopin    3. Encounter for medication monitoring  Klonopin last filled 12-11-17 #30Low abuse/misuse potential. Patient counseled and we discussed controlled medications, effects, side effects, indications, risks vs benefits, precautions, potential interactions/dangers w/ other   medications, abuse potential and the possible negative health implications/risks associated w/ overuse/abuse/misuse of controlled stimulant or sedating medications including overdose and death. Patient expressed understanding and  agreement with current plan of care. CSA signed 8-2017      4. Benign essential hypertension    5. Alcoholism [facility])  Assessment & Plan:  Stable, based on history, physical exam and review of pertinent labs, studies and medications; meds reconciled; continue current treatment plan.   Key Psychotherapeutic Meds             traZODone (DESYREL) 50 mg tablet  (Taking) TAKE 1-1 AND 1/2 TABLETS BY MOUTH NIGHTLY        Other Key Behavioral Health Meds             clonazePAM (KLONOPIN) 0.5 mg tablet  (Taking) TAKE 1 TABLET AT BEDTIME AS NEEDED        Lab Results   Component Value Date/Time    Sodium 136 08/21/2017 08:59 AM    Creatinine 0.71 08/21/2017 08:59 AM    TSH 1.760 08/21/2017 08:59 AM    WBC 10.5 09/12/2017 09:21 AM    ALT (SGPT) 33 08/21/2017 08:59 AM    AST (SGOT) 46 08/21/2017 08:59 AM         6. Rectal cancer [facility])  Assessment & Plan: This condition is managed by Specialist.   Oncology Dr Ernesto Scanlon Oncology Meds             capecitabine (XELODA) 150 mg tablet  (Taking) take 2 tablets BY MOUTH with food TWICE DAILY with 4 tabs of 500mg (total DOSE 2300mg TWICE DAILY)  2 weeks on/1 week off    capecitabine (XELODA) 500 mg tablet  (Taking) take 4 tablets BY MOUTH with food TWICE DAILY with 2 tabs of 150mg (total DOSE 2300mg TWICE DAILY)  2 weeks on/1 week off    prochlorperazine (COMPAZINE) 10 mg tablet  (Taking) TAKE 1 TABLET BY MOUTH EVERY 6 HOURS AS NEEDED FOR NAUSEA        Key Pain Meds     The patient is on no pain meds. Lab Results   Component Value Date/Time    WBC 10.5 09/12/2017 09:21 AM    ABS.  NEUTROPHILS 7.4 09/12/2017 09:21 AM    HGB 15.0 09/12/2017 09:21 AM    HCT 43.4 09/12/2017 09:21 AM    PLATELET 949 07/41/2087 09:21 AM    Creatinine 0.71 08/21/2017 08:59 AM    BUN 6 08/21/2017 08:59 AM    ALT (SGPT) 33 08/21/2017 08:59 AM    AST (SGOT) 46 08/21/2017 08:59 AM    Albumin 4.3 08/21/2017 08:59 AM    Prostate Specific Ag 1.0 09/12/2017 09:21 AM       Follow up 6months, sooner prn

## 2018-02-06 NOTE — ASSESSMENT & PLAN NOTE
Stable, based on history, physical exam and review of pertinent labs, studies and medications; meds reconciled; continue current treatment plan.   Key Psychotherapeutic Meds             traZODone (DESYREL) 50 mg tablet  (Taking) TAKE 1-1 AND 1/2 TABLETS BY MOUTH NIGHTLY        Other Key Behavioral Health Meds             clonazePAM (KLONOPIN) 0.5 mg tablet  (Taking) TAKE 1 TABLET AT BEDTIME AS NEEDED        Lab Results   Component Value Date/Time    Sodium 136 08/21/2017 08:59 AM    Creatinine 0.71 08/21/2017 08:59 AM    TSH 1.760 08/21/2017 08:59 AM    WBC 10.5 09/12/2017 09:21 AM    ALT (SGPT) 33 08/21/2017 08:59 AM    AST (SGOT) 46 08/21/2017 08:59 AM

## 2018-02-06 NOTE — ASSESSMENT & PLAN NOTE
This condition is managed by Specialist.   Oncology Dr Alize Keen Oncology Meds             capecitabine (XELODA) 150 mg tablet  (Taking) take 2 tablets BY MOUTH with food TWICE DAILY with 4 tabs of 500mg (total DOSE 2300mg TWICE DAILY)  2 weeks on/1 week off    capecitabine (XELODA) 500 mg tablet  (Taking) take 4 tablets BY MOUTH with food TWICE DAILY with 2 tabs of 150mg (total DOSE 2300mg TWICE DAILY)  2 weeks on/1 week off    prochlorperazine (COMPAZINE) 10 mg tablet  (Taking) TAKE 1 TABLET BY MOUTH EVERY 6 HOURS AS NEEDED FOR NAUSEA        Key Pain Meds     The patient is on no pain meds. Lab Results   Component Value Date/Time    WBC 10.5 09/12/2017 09:21 AM    ABS.  NEUTROPHILS 7.4 09/12/2017 09:21 AM    HGB 15.0 09/12/2017 09:21 AM    HCT 43.4 09/12/2017 09:21 AM    PLATELET 130 75/66/9864 09:21 AM    Creatinine 0.71 08/21/2017 08:59 AM    BUN 6 08/21/2017 08:59 AM    ALT (SGPT) 33 08/21/2017 08:59 AM    AST (SGOT) 46 08/21/2017 08:59 AM    Albumin 4.3 08/21/2017 08:59 AM    Prostate Specific Ag 1.0 09/12/2017 09:21 AM

## 2018-02-06 NOTE — PROGRESS NOTES
Chief Complaint   Patient presents with    Medication Evaluation     controlled med check      1. Have you been to the ER, urgent care clinic since your last visit? Hospitalized since your last visit? No    2. Have you seen or consulted any other health care providers outside of the [address] [place] [address] since your last visit? Include any pap smears or colon screening.    Yes Dr Eric Pierce-oncologist

## 2018-02-06 NOTE — PATIENT INSTRUCTIONS
Anxiety Disorder: Care Instructions  Your Care Instructions    Anxiety is a normal reaction to stress. Difficult situations can cause you to have symptoms such as sweaty palms and a nervous feeling. In an anxiety disorder, the symptoms are far more severe. Constant worry, muscle tension, trouble sleeping, nausea and diarrhea, and other symptoms can make normal daily activities difficult or impossible. These symptoms may occur for no reason, and they can affect your work, school, or social life. Medicines, counseling, and self-care can all help. Follow-up care is a key part of your treatment and safety. Be sure to make and go to all appointments, and call your doctor if you are having problems. It's also a good idea to know your test results and keep a list of the medicines you take. How can you care for yourself at home? · Take medicines exactly as directed. Call your doctor if you think you are having a problem with your medicine. · Go to your counseling sessions and follow-up appointments. · Recognize and accept your anxiety. Then, when you are in a situation that makes you anxious, say to yourself, \"This is not an emergency. I feel uncomfortable, but I am not in danger. I can keep going even if I feel anxious. \"  · Be kind to your body:  ¨ Relieve tension with exercise or a massage. ¨ Get enough rest.  ¨ Avoid alcohol, caffeine, nicotine, and illegal drugs. They can increase your anxiety level and cause sleep problems. ¨ Learn and do relaxation techniques. See below for more about these techniques. · Engage your mind. Get out and do something you enjoy. Go to a funny movie, or take a walk or hike. Plan your day. Having too much or too little to do can make you anxious. · Keep a record of your symptoms. Discuss your fears with a good friend or family member, or join a support group for people with similar problems. Talking to others sometimes relieves stress.   · Get involved in social groups, or volunteer to help others. Being alone sometimes makes things seem worse than they are. · Get at least 30 minutes of exercise on most days of the week to relieve stress. Walking is a good choice. You also may want to do other activities, such as running, swimming, cycling, or playing tennis or team sports. Relaxation techniques  Do relaxation exercises 10 to 20 minutes a day. You can play soothing, relaxing music while you do them, if you wish. · Tell others in your house that you are going to do your relaxation exercises. Ask them not to disturb you. · Find a comfortable place, away from all distractions and noise. · Lie down on your back, or sit with your back straight. · Focus on your breathing. Make it slow and steady. · Breathe in through your nose. Breathe out through either your nose or mouth. · Breathe deeply, filling up the area between your navel and your rib cage. Breathe so that your belly goes up and down. · Do not hold your breath. · Breathe like this for 5 to 10 minutes. Notice the feeling of calmness throughout your whole body. As you continue to breathe slowly and deeply, relax by doing the following for another 5 to 10 minutes:  · Tighten and relax each muscle group in your body. You can begin at your toes and work your way up to your head. · Imagine your muscle groups relaxing and becoming heavy. · Empty your mind of all thoughts. · Let yourself relax more and more deeply. · Become aware of the state of calmness that surrounds you. · When your relaxation time is over, you can bring yourself back to alertness by moving your fingers and toes and then your hands and feet and then stretching and moving your entire body. Sometimes people fall asleep during relaxation, but they usually wake up shortly afterward. · Always give yourself time to return to full alertness before you drive a car or do anything that might cause an accident if you are not fully alert.  Never play a relaxation tape while you drive a car. When should you call for help? Call 911 anytime you think you may need emergency care. For example, call if:  ? · You feel you cannot stop from hurting yourself or someone else. ? Keep the numbers for these national suicide hotlines: 7-989-389-TALK (4-532.452.5446) and 6-608-ZFGEGIN (7-815.223.8790). If you or someone you know talks about suicide or feeling hopeless, get help right away. ? Watch closely for changes in your health, and be sure to contact your doctor if:  ? · You have anxiety or fear that affects your life. ? · You have symptoms of anxiety that are new or different from those you had before. Where can you learn more? Go to http://santos-rob.info/. Enter P754 in the search box to learn more about \"Anxiety Disorder: Care Instructions. \"  Current as of: May 12, 2017  Content Version: 11.4  © 5890-8814 [employer]. Care instructions adapted under license by [employer] (which disclaims liability or warranty for this information). If you have questions about a medical condition or this instruction, always ask your healthcare professional. [address] any warranty or liability for your use of this information.

## 2018-02-06 NOTE — ASSESSMENT & PLAN NOTE
This condition is managed by Specialist.   [facility]             capecitabine (XELODA) 150 mg tablet  (Taking) take 2 tablets BY MOUTH with food TWICE DAILY with 4 tabs of 500mg (total DOSE 2300mg TWICE DAILY)  2 weeks on/1 week off    capecitabine (XELODA) 500 mg tablet  (Taking) take 4 tablets BY MOUTH with food TWICE DAILY with 2 tabs of 150mg (total DOSE 2300mg TWICE DAILY)  2 weeks on/1 week off    prochlorperazine (COMPAZINE) 10 mg tablet  (Taking) TAKE 1 TABLET BY MOUTH EVERY 6 HOURS AS NEEDED FOR NAUSEA        Key Pain Meds     The patient is on no pain meds. Lab Results   Component Value Date/Time    WBC 10.5 09/12/2017 09:21 AM    ABS.  NEUTROPHILS 7.4 09/12/2017 09:21 AM    HGB 15.0 09/12/2017 09:21 AM    HCT 43.4 09/12/2017 09:21 AM    PLATELET 262 05/21/5782 09:21 AM    Creatinine 0.71 08/21/2017 08:59 AM    BUN 6 08/21/2017 08:59 AM    ALT (SGPT) 33 08/21/2017 08:59 AM    AST (SGOT) 46 08/21/2017 08:59 AM    Albumin 4.3 08/21/2017 08:59 AM    Prostate Specific Ag 1.0 09/12/2017 09:21 AM

## 2018-05-29 DIAGNOSIS — R25.1 TREMOR: ICD-10-CM

## 2018-05-29 DIAGNOSIS — I10 BENIGN ESSENTIAL HYPERTENSION: ICD-10-CM

## 2018-05-29 DIAGNOSIS — F41.9 ANXIETY: ICD-10-CM

## 2018-05-29 RX ORDER — AMLODIPINE BESYLATE 10 MG/1
TABLET ORAL
Qty: 90 TAB | Refills: 0 | Status: CANCELLED | OUTPATIENT
Start: 2018-05-29

## 2018-05-30 RX ORDER — CLONAZEPAM 0.5 MG/1
TABLET ORAL
Qty: 30 TAB | Refills: 1 | OUTPATIENT
Start: 2018-05-30 | End: 2018-09-20 | Stop reason: SDUPTHER

## 2018-09-20 ENCOUNTER — OFFICE VISIT (OUTPATIENT)
Dept: FAMILY MEDICINE CLINIC | Age: 58
End: 2018-09-20

## 2018-09-20 VITALS
DIASTOLIC BLOOD PRESSURE: 84 MMHG | OXYGEN SATURATION: 97 % | SYSTOLIC BLOOD PRESSURE: 142 MMHG | WEIGHT: 229 LBS | RESPIRATION RATE: 18 BRPM | HEIGHT: 73 IN | HEART RATE: 99 BPM | TEMPERATURE: 98.8 F | BODY MASS INDEX: 30.35 KG/M2

## 2018-09-20 DIAGNOSIS — I10 BENIGN ESSENTIAL HYPERTENSION: ICD-10-CM

## 2018-09-20 DIAGNOSIS — Z79.899 CONTROLLED SUBSTANCE AGREEMENT SIGNED: ICD-10-CM

## 2018-09-20 DIAGNOSIS — I97.89: ICD-10-CM

## 2018-09-20 DIAGNOSIS — R25.1 TREMOR: ICD-10-CM

## 2018-09-20 DIAGNOSIS — F41.9 HYPOSOMNIA, INSOMNIA OR SLEEPLESSNESS ASSOCIATED WITH ANXIETY: ICD-10-CM

## 2018-09-20 DIAGNOSIS — F51.05 HYPOSOMNIA, INSOMNIA OR SLEEPLESSNESS ASSOCIATED WITH ANXIETY: ICD-10-CM

## 2018-09-20 DIAGNOSIS — C78.7 ADENOCARCINOMA OF RECTUM METASTATIC TO LIVER (HCC): ICD-10-CM

## 2018-09-20 DIAGNOSIS — R00.0 TACHYCARDIA: ICD-10-CM

## 2018-09-20 DIAGNOSIS — C20 ADENOCARCINOMA OF RECTUM METASTATIC TO LIVER (HCC): ICD-10-CM

## 2018-09-20 DIAGNOSIS — I47.1: ICD-10-CM

## 2018-09-20 DIAGNOSIS — Z51.81 ENCOUNTER FOR MEDICATION MONITORING: ICD-10-CM

## 2018-09-20 DIAGNOSIS — F41.9 ANXIETY: Primary | ICD-10-CM

## 2018-09-20 RX ORDER — METOPROLOL SUCCINATE 100 MG/1
100 TABLET, EXTENDED RELEASE ORAL DAILY
COMMUNITY
Start: 2018-08-05

## 2018-09-20 RX ORDER — DILTIAZEM HYDROCHLORIDE EXTENDED-RELEASE TABLETS 240 MG/1
240 TABLET, EXTENDED RELEASE ORAL DAILY
COMMUNITY

## 2018-09-20 RX ORDER — CLONAZEPAM 0.5 MG/1
TABLET ORAL
Qty: 30 TAB | Refills: 3 | Status: SHIPPED | OUTPATIENT
Start: 2018-09-20 | End: 2019-07-19 | Stop reason: SDUPTHER

## 2018-09-20 RX ORDER — FAMOTIDINE 40 MG/1
20 TABLET, FILM COATED ORAL
COMMUNITY
End: 2018-09-20

## 2018-09-20 RX ORDER — CAPECITABINE 500 MG/1
TABLET, FILM COATED ORAL
COMMUNITY
Start: 2018-03-06 | End: 2018-09-20 | Stop reason: SDUPTHER

## 2018-09-20 RX ORDER — TRAZODONE HYDROCHLORIDE 100 MG/1
75 TABLET ORAL
COMMUNITY
End: 2018-09-20

## 2018-09-20 RX ORDER — LISINOPRIL 10 MG/1
10 TABLET ORAL DAILY
COMMUNITY
Start: 2018-08-05

## 2018-09-20 RX ORDER — LISINOPRIL AND HYDROCHLOROTHIAZIDE 20; 25 MG/1; MG/1
1 TABLET ORAL
COMMUNITY
Start: 2018-02-08 | End: 2018-09-20

## 2018-09-20 RX ORDER — FAMOTIDINE 20 MG/1
20 TABLET, FILM COATED ORAL 2 TIMES DAILY
COMMUNITY

## 2018-09-20 RX ORDER — CAPECITABINE 150 MG/1
TABLET, FILM COATED ORAL
COMMUNITY
Start: 2018-03-06 | End: 2018-09-20 | Stop reason: SDUPTHER

## 2018-09-20 RX ORDER — TRAZODONE HYDROCHLORIDE 50 MG/1
TABLET ORAL
COMMUNITY
Start: 2018-01-29 | End: 2018-09-20

## 2018-09-20 RX ORDER — POTASSIUM CHLORIDE 20 MEQ/1
20 TABLET, EXTENDED RELEASE ORAL
COMMUNITY
Start: 2018-08-05 | End: 2018-09-20

## 2018-09-20 RX ORDER — OXYCODONE HYDROCHLORIDE 5 MG/1
TABLET ORAL
Refills: 0 | COMMUNITY
Start: 2018-08-24 | End: 2018-09-20

## 2018-09-20 RX ORDER — PSEUDOEPHEDRINE HYDROCHLORIDE 60 MG/1
30 TABLET ORAL
COMMUNITY
End: 2018-09-20

## 2018-09-20 NOTE — PATIENT INSTRUCTIONS
Body Mass Index: Care Instructions Your Care Instructions Body mass index (BMI) can help you see if your weight is raising your risk for health problems. It uses a formula to compare how much you weigh with how tall you are. · A BMI lower than 18.5 is considered underweight. · A BMI between 18.5 and 24.9 is considered healthy. · A BMI between 25 and 29.9 is considered overweight. A BMI of 30 or higher is considered obese. If your BMI is in the normal range, it means that you have a lower risk for weight-related health problems. If your BMI is in the overweight or obese range, you may be at increased risk for weight-related health problems, such as high blood pressure, heart disease, stroke, arthritis or joint pain, and diabetes. If your BMI is in the underweight range, you may be at increased risk for health problems such as fatigue, lower protection (immunity) against illness, muscle loss, bone loss, hair loss, and hormone problems. BMI is just one measure of your risk for weight-related health problems. You may be at higher risk for health problems if you are not active, you eat an unhealthy diet, or you drink too much alcohol or use tobacco products. Follow-up care is a key part of your treatment and safety. Be sure to make and go to all appointments, and call your doctor if you are having problems. It's also a good idea to know your test results and keep a list of the medicines you take. How can you care for yourself at home? · Practice healthy eating habits. This includes eating plenty of fruits, vegetables, whole grains, lean protein, and low-fat dairy. · If your doctor recommends it, get more exercise. Walking is a good choice. Bit by bit, increase the amount you walk every day. Try for at least 30 minutes on most days of the week. · Do not smoke. Smoking can increase your risk for health problems.  If you need help quitting, talk to your doctor about stop-smoking programs and medicines. These can increase your chances of quitting for good. · Limit alcohol to 2 drinks a day for men and 1 drink a day for women. Too much alcohol can cause health problems. If you have a BMI higher than 25 · Your doctor may do other tests to check your risk for weight-related health problems. This may include measuring the distance around your waist. A waist measurement of more than 40 inches in men or 35 inches in women can increase the risk of weight-related health problems. · Talk with your doctor about steps you can take to stay healthy or improve your health. You may need to make lifestyle changes to lose weight and stay healthy, such as changing your diet and getting regular exercise. If you have a BMI lower than 18.5 · Your doctor may do other tests to check your risk for health problems. · Talk with your doctor about steps you can take to stay healthy or improve your health. You may need to make lifestyle changes to gain or maintain weight and stay healthy, such as getting more healthy foods in your diet and doing exercises to build muscle. Where can you learn more? Go to http://santos-rob.info/. Enter S176 in the search box to learn more about \"Body Mass Index: Care Instructions. \" Current as of: October 13, 2016 Content Version: 11.4 © 2198-7971 Healthwise, Incorporated. Care instructions adapted under license by [employer] (which disclaims liability or warranty for this information). If you have questions about a medical condition or this instruction, always ask your healthcare professional. [place] [address] any warranty or liability for your use of this information. Anxiety Disorder: Care Instructions Your Care Instructions Anxiety is a normal reaction to stress. Difficult situations can cause you to have symptoms such as sweaty palms and a nervous feeling. In an anxiety disorder, the symptoms are far more severe. Constant worry, muscle tension, trouble sleeping, nausea and diarrhea, and other symptoms can make normal daily activities difficult or impossible. These symptoms may occur for no reason, and they can affect your work, school, or social life. Medicines, counseling, and self-care can all help. Follow-up care is a key part of your treatment and safety. Be sure to make and go to all appointments, and call your doctor if you are having problems. It's also a good idea to know your test results and keep a list of the medicines you take. How can you care for yourself at home? · Take medicines exactly as directed. Call your doctor if you think you are having a problem with your medicine. · Go to your counseling sessions and follow-up appointments. · Recognize and accept your anxiety. Then, when you are in a situation that makes you anxious, say to yourself, \"This is not an emergency. I feel uncomfortable, but I am not in danger. I can keep going even if I feel anxious. \" · Be kind to your body: ¨ Relieve tension with exercise or a massage. ¨ Get enough rest. 
¨ Avoid alcohol, caffeine, nicotine, and illegal drugs. They can increase your anxiety level and cause sleep problems. ¨ Learn and do relaxation techniques. See below for more about these techniques. · Engage your mind. Get out and do something you enjoy. Go to a funny movie, or take a walk or hike. Plan your day. Having too much or too little to do can make you anxious. · Keep a record of your symptoms. Discuss your fears with a good friend or family member, or join a support group for people with similar problems. Talking to others sometimes relieves stress. · Get involved in social groups, or volunteer to help others. Being alone sometimes makes things seem worse than they are.  
· Get at least 30 minutes of exercise on most days of the week to relieve stress. Walking is a good choice. You also may want to do other activities, such as running, swimming, cycling, or playing tennis or team sports. Relaxation techniques Do relaxation exercises 10 to 20 minutes a day. You can play soothing, relaxing music while you do them, if you wish. · Tell others in your house that you are going to do your relaxation exercises. Ask them not to disturb you. · Find a comfortable place, away from all distractions and noise. · Lie down on your back, or sit with your back straight. · Focus on your breathing. Make it slow and steady. · Breathe in through your nose. Breathe out through either your nose or mouth. · Breathe deeply, filling up the area between your navel and your rib cage. Breathe so that your belly goes up and down. · Do not hold your breath. · Breathe like this for 5 to 10 minutes. Notice the feeling of calmness throughout your whole body. As you continue to breathe slowly and deeply, relax by doing the following for another 5 to 10 minutes: · Tighten and relax each muscle group in your body. You can begin at your toes and work your way up to your head. · Imagine your muscle groups relaxing and becoming heavy. · Empty your mind of all thoughts. · Let yourself relax more and more deeply. · Become aware of the state of calmness that surrounds you. · When your relaxation time is over, you can bring yourself back to alertness by moving your fingers and toes and then your hands and feet and then stretching and moving your entire body. Sometimes people fall asleep during relaxation, but they usually wake up shortly afterward. · Always give yourself time to return to full alertness before you drive a car or do anything that might cause an accident if you are not fully alert. Never play a relaxation tape while you drive a car. When should you call for help? Call 911 anytime you think you may need emergency care. For example, call if:   · You feel you cannot stop from hurting yourself or someone else.  
Lisandra Sargent the numbers for these national suicide hotlines: 6-357-617-TALK (7-680.981.5392) and 9-441-KIQOMET (6-316.283.3359). If you or someone you know talks about suicide or feeling hopeless, get help right away. 
 Watch closely for changes in your health, and be sure to contact your doctor if: 
  · You have anxiety or fear that affects your life.  
  · You have symptoms of anxiety that are new or different from those you had before. Where can you learn more? Go to http://santos-rob.info/. Enter P754 in the search box to learn more about \"Anxiety Disorder: Care Instructions. \" Current as of: December 7, 2017 Content Version: 11.7 © 7618-9169 [employer]. Care instructions adapted under license by [employer] (which disclaims liability or warranty for this information). If you have questions about a medical condition or this instruction, always ask your healthcare professional. [address] any warranty or liability for your use of this information.

## 2018-09-20 NOTE — PROGRESS NOTES
Chief Complaint Patient presents with  Hypertension  
  fasting follow up - did have some blood work done last week - has results with him  Anxiety  Medication Evaluation  
  controlled med check 1. Have you been to the ER, urgent care clinic since your last visit? Hospitalized since your last visit? Yes [address] - Liver surgery on 7/29/18 2. Have you seen or consulted any other health care providers outside of the [address] [place] [address] since your last visit? Include any pap smears or colon screening. Yes see above

## 2018-09-20 NOTE — PROGRESS NOTES
HISTORY OF PRESENT ILLNESS  
HPI 
6 month follow up controlled medication check for prn Klonopin usage for anxiety, insomnia and tremor. 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 He underwent chemo last fall and had resection of liver mass recently on 7-29-18. He has recuperated well from that. At his most recent follow up appt w/ Oncology this month, he had labs done and brought in a copy to scan to chart. He also reports that his most recent PET scan was + for lung mets which is a new finding. It is suspected that he will have to do another round of chemo later this year. Since his diagnosis of cancer a year ago, he has gone out on disability. Aside from all the recent stress related to his ongoing health issues, his anxiety has been under pretty good control. He has been taking things in stride. He takes Klonopin 1 tablet at bedtime may 3 nights a week because the Trazodone generally has kept his sleep patterns pretty stable. He uses the Klonopin if the anxiety is making his tremor worse or causes increased sleeplessness that renders him lying awake, tossing, turning and anxious. The Klonopin helps a great deal when needed. He states that after the liver surgery 7-29-18, a cardiologist was brought in for acute BP and heart rate complications. He states that since that time and at his follow up shortly after that w/ cardiology that his BP medication regimen has changed accordingly: 
Norvasc DC'd. Toprol decreased from 1 1/2 to 1 tablet daily. Prinzide 20-25 tablets were changed to plain Lisinopril 10 mg daily and the K+ was dc'd. Diltiazem was added. Since that time he has not been monitoring home BP's but gets them checked at his various doctor's appointments and states they have been running good.  
For instance at his oncologist's office yesterday he states his BP was 130/79 and that his heart rates have seemed to be ok as well. He has cardiology follow up w/ Dr Dorian Longo again next month. REVIEW OF SYMPTOMS  
  Review of Systems Constitutional: Negative. Respiratory: Negative. Cardiovascular: Negative. Gastrointestinal: Negative for nausea and vomiting. Psychiatric/Behavioral: Negative for depression.  
 
 
  
 PROBLEM LIST/MEDICAL HISTORY  
  
Problem List  Date Reviewed: 9/20/2018 Codes Class Noted Postoperative supraventricular tachycardia (HCC) ICD-10-CM: I47.1 ICD-9-CM: 997.1  9/20/2018 Overview Signed 9/20/2018  8:50 AM by Jose Enrique Garza MD  
  [facility] 7-29-18, Cardio Dr Dorian Longo Adenocarcinoma of rectum metastatic to liver ([facility] [address] ICD-10-CM: C20, C78.7 ICD-9-CM: 154.1, 197.7  2/6/2018 Overview Addendum 9/20/2018 11:44 AM by Jose Enrique Garza MD  
  [facility]  Rectal cancer ([facility] [address] ICD-10-CM: C20 
ICD-9-CM: 154.1  10/2/2017 Overview Signed 10/2/2017  9:49 PM by Jose Enrique Garza MD  
  Colonoscopy: 6 cm mass in rectum, bx +; Dr Dariana Rolle; Onc Dr Gamaliel Gunderson Controlled substance agreement signed ICD-10-CM: Z79.899 ICD-9-CM: V58.69  8/21/2017 Overview Addendum 9/20/2018  8:50 AM by Jose Enrique Garza MD  
  [birth year], 9-20-18 Benign essential hypertension ICD-10-CM: I10 
ICD-9-CM: 401.1  10/20/2016 Hypercholesterolemia ICD-10-CM: E78.00 ICD-9-CM: 272.0  10/20/2016 Pre-diabetes ICD-10-CM: R73.03 
ICD-9-CM: 790.29  7/7/2013 Overview Signed 7/7/2013 10:54 PM by Jose Enrique Garza MD  
  2009 Left Plantar fasciitis ICD-10-CM: M72.2 ICD-9-CM: 728.71  7/20/2012 Overview Signed 7/20/2012 11:57 AM by Jose Enrique Garza MD  
  [facility], Ortho ~ 2006; 4/2012 White coat syndrome ICD-10-CM: [place] ICD-9-CM: [place] St[place]gers  7/20/2012 Elevated liver enzymes ICD-10-CM: R74.8 ICD-9-CM: 790.5  7/20/2012 Overview Signed 7/20/2012  2:09 PM by Finn Mahan MD  
  R/t alcohol Sinus tachycardia ICD-10-CM: R00.0 ICD-9-CM: 427.89  9/19/2011 Rectal polyp & Internal Hemorrhoids 2010 ICD-10-CM: K62.1 ICD-9-CM: 569.0  11/18/2010 Overview Signed 11/18/2010  1:25 PM by Finn Mahan MD  
  Colonoscopy 3/2010 Dr Shelby Perez; f/u 5 yrs Alcoholism ([facility] [address] ICD-10-CM: R62.42 ICD-9-CM: 303.90  Unknown Overview Addendum 7/20/2012 12:19 PM by Finn Mahan MD  
  [facility] clinical trials Anxiety ICD-10-CM: F41.9 ICD-9-CM: 300.00  Unknown Tremor ICD-10-CM: R25.1 ICD-9-CM: 781.0  Unknown Concentric LVH (left ventricular hypertrophy) ICD-10-CM: I51.7 ICD-9-CM: 429.3  Unknown Overview Addendum 11/18/2010  1:23 PM by Finn Mahan MD  
  May 2008 EF 60%; Stress tests normal 1997, 2003 H/O Hepatitis C, s/p Tx ICD-10-CM: B19.20 ICD-9-CM: 070.70  Unknown Overview Addendum 7/20/2012  2:08 PM by Finn Mahan MD  
  1997; s/p Interferon and Ribaviran; Dr Shelby Perez; Liver biopsy 1998 for staging Genital HSV ICD-10-CM: A60.00 ICD-9-CM: 054.10  4/1/2010 Overview Signed 11/18/2010  1:22 PM by Finn Mahan MD  
  7204 Situational stress ICD-10-CM: F43.9 ICD-9-CM: V62.89  4/1/2010 Overview Signed 11/18/2010  1:23 PM by Finn Mahan MD  
  2005 S/P colonoscopy with polypectomy ICD-10-CM: Z98.890 ICD-9-CM: V45.89  3/15/2010 Overview Signed 4/20/2010  9:47 PM by Finn Mahan MD  
  Rectal polyp & internal Hemorrhoids; Dr Shelby Perez; ok x 5yrs 
  
  
   
  
 
 
  PAST SURGICAL HISTORY  
   
Past Surgical History:  
Procedure Laterality Date  COLONOSCOPY  3/2010 -rectal polyp and intern hemorr-f/u in 5 years  HX CHOLECYSTECTOMY  07/29/2018  HX COLONOSCOPY  08/24/2017, Dr [facility]-Moderate diverticulosis in the left side of the colon- 6mm mass in the rectum ( Biopsy)  HX GI  3/1998 Liver biopsy, Dr Marv Mazariegos  HX HERNIA REPAIR  childhood BILATERAL   
 HX ORTHOPAEDIC  1/24/14 Birda Bellow resulting in Left hip and pelvic fracture, repair w/ screws; Dr Rosario Sevilla at [facility]  HX OTHER SURGICAL  07/29/2018 Radio abalation of liver tumors [facility], Dr Mauricio Sneed  HX VASECTOMY  2003  STRESS TEST CARDIAC  2003 Negative ETT MEDICATIONS  
  
Current Outpatient Prescriptions Medication Sig  
 lisinopril (PRINIVIL, ZESTRIL) 10 mg tablet 10 mg.  
 metoprolol succinate (TOPROL-XL) 100 mg tablet Take 100 mg by mouth daily.  dilTIAZem ER (CARDIZEM LA) 240 mg Tb24 tablet Take 240 mg by mouth daily.  famotidine (PEPCID) 20 mg tablet Take 20 mg by mouth two (2) times daily as needed.  clonazePAM (KLONOPIN) 0.5 mg tablet TAKE 1 TABLET BY MOUTH AT BEDTIME AS NEEDED  capecitabine (XELODA) 150 mg tablet take 2 tablets BY MOUTH with food TWICE DAILY with 4 tabs of 500mg (total DOSE 2300mg TWICE DAILY)  2 weeks on/1 week off  capecitabine (XELODA) 500 mg tablet take 4 tablets BY MOUTH with food TWICE DAILY with 2 tabs of 150mg (total DOSE 2300mg TWICE DAILY)  2 weeks on/1 week off  pseudoephedrine (SUDAFED) 30 mg tablet Take 30 mg by mouth daily. TAKES EVERY MORNING   
 traZODone (DESYREL) 50 mg tablet TAKE 1-1 AND 1/2 TABLETS BY MOUTH NIGHTLY  MULTIVITAMINS (MULTIVITAMIN PO) Take  by mouth daily. No current facility-administered medications for this visit. ALLERGIES No Known Allergies SOCIAL HISTORY  
   
Social History Social History  Marital status:  Spouse name: N/A  
 Number of children: 2  
 Years of education: N/A Occupational History  IT by trade  Laid off from [address] [place] [address] after 25 yrs  Laid [employer] more recently  ; : Started new job at [email or web address]   
  On Disability at present Social History Main Topics  Smoking status: Former Smoker Packs/day: 0.50 Years: 10.00 Types: Cigarettes Quit date: 1/1/2004  Smokeless tobacco: Never Used  Alcohol use 22.5 oz/week 45 Cans of beer per week Comment: 3-5 beers a day now; chronic alcoholism, has participated in clinical trials at [facility]; relapses freq; ranges from 2 cans to 12-15 beers a day, when heavier can get up to 20beers in a day; still battling w/ this  Drug use: No  
 Sexual activity: Yes  
  Partners: Female Other Topics Concern  Caffeine Concern No  
  rare, just an occ diet coke  Stress Concern No  
 Weight Concern No  
 Special Diet Yes  
  low sodium, low fat, lots of salads, avoids fried foods  Exercise No  
  just trying to stay active Social History Narrative  
 
  
IMMUNIZATIONS Immunization History Administered Date(s) Administered  TD Vaccine 01/01/2007  Tdap 07/22/2016 FAMILY HISTORY Family History Problem Relation Age of Onset  Diabetes Paternal Grandmother  Heart Disease Paternal Grandfather  Hypertension Brother 50  Colon Polyps Mother 78  
 Breast Cancer Maternal Aunt Fredi Schroeder Visit Vitals  /84 (BP 1 Location: Left arm, BP Patient Position: Sitting)  Pulse 99  Temp 98.8 °F (37.1 °C) (Oral)  Resp 18  Ht 6' 1\" (1.854 m)  Wt 229 lb (103.9 kg)  SpO2 97%  BMI 30.21 kg/m2 PHYSICAL EXAMINATION  
  Physical Exam  
Constitutional: He is oriented to person, place, and time and well-developed, well-nourished, and in no distress. Cardiovascular: Regular rhythm. No murmur heard. Pulmonary/Chest: Effort normal and breath sounds normal.  
Abdominal:  
Distended, obese. Healed large surgical scar overlying liver. Neurological: He is alert and oriented to person, place, and time. He has normal strength. He displays no tremor.  Gait normal. Coordination normal.  
 Skin: Skin is warm and dry. Psychiatric: Mood, affect and judgment normal.  
Vitals reviewed. 
 
 
  
 
 
 ASSESSMENT & PLAN  
 
  ICD-10-CM ICD-9-CM 1. Anxiety F41.9 300.00 clonazePAM (KLONOPIN) 0.5 mg tablet 2. Hyposomnia, insomnia or sleeplessness associated with anxiety F51.05 293.84   
 F41.9 327.02   
3. Tremor R25.1 781.0 clonazePAM (KLONOPIN) 0.5 mg tablet 4. Benign essential hypertension I10 401.1 5. Tachycardia R00.0 785.0 6. Controlled substance agreement signed Z79.899 V58.69   
7. Encounter for medication monitoring Z51.81 V58.83 TOXASSURE SELECT 13 (MW)  
8. Adenocarcinoma of rectum metastatic to liver (HCC) C20 154.1 C78.7 197.7 9. Postoperative supraventricular tachycardia (HCC) I47.1 997.1 Patient is s/p recent resection of liver mass due to adenoca w/ mets. He states that after the liver surgery on 7-29-18, a cardiologist was brought in for acute BP and heart rate complications post operatively. He states that since that time and at his follow up shortly after that w/ cardiology that his BP medication regimen has changed accordingly: 
Norvasc DC'd. Toprol decreased from 1 1/2 to 1 tablet daily. Prinzide 20-25 tablets were changed to plain Lisinopril 10 mg daily and the K+ was dc'd. Diltiazem was added. Since that time he has not been monitoring home BP's but gets them checked at his various doctor's appointments and states they have been running good. For instance at his oncologist's office yesterday he states his BP was 130/79 and that his heart rates have seemed to be ok as well. He has cardiology follow up w/ Dr Eri Soliman again next month. He brought in a copy of labs done per his Oncologist recently which I filed to scan in EMR. No additional labs done at this time. Reviewed diet, nutrition, exercise, weight management, BMI/goals, age/risk based screening recommendations, health maintenance & prevention counseling.  pulled and reviewed. Klonopin last filled 6-29-18 #30. He underwent abdominal surgery for resection of liver mass the end of July and was being prescribed Oxycodone per Dr Slade Mckeon (surgeon) which was last filled 8-24-18. He states he has completed that rx. Uses the Klonopin ~ 3 nights a week at present. Admits to recreational marijuana usage prn. Patient counseled and we discussed controlled medications, effects, side effects, indications, risks vs benefits, precautions, potential interactions/dangers w/ other medications, illicit drugs, abuse potential and the possible negative health implications/risks associated w/ overuse/abuse/misuse of controlled stimulant or sedating medications including overdose and death. Patient expressed understanding and agreement with current plan of care. Controlled Substance Agreement reviewed, signed, copy given, copy filed to scan. UDS collected today RTC  6months for next follow up and medication check.  Follow up sooner prn

## 2018-09-27 LAB — DRUGS UR: NORMAL

## 2018-09-27 NOTE — PROGRESS NOTES
Patient counseled at office visit about potential harms/risks associated w/ illicit drug use as well as its usage with controlled medications increasing that risk further. Advised against usage.  Refer to office note for details

## 2018-10-10 NOTE — TELEPHONE ENCOUNTER
We did this 1-2018 for 90 day supply x 1 year. Should not be due until 1-2019. Please check w/ pharmacy to make sure they have this accurately so patient does not miss out on his renewal that should be there. Do not hit \"deny\" or \"refuse\" without notifying patient that it is actually approved, because then he will think we didn't approve something that is already on file and authorized. Thanks. The reason for refusal does not show up in patient's MyChart, so they assume they are not getting their rx.

## 2018-10-11 RX ORDER — TRAZODONE HYDROCHLORIDE 50 MG/1
TABLET ORAL
Qty: 135 TAB | Refills: 3 | Status: SHIPPED | OUTPATIENT
Start: 2018-10-11 | End: 2019-09-17 | Stop reason: SDUPTHER

## 2018-10-11 NOTE — TELEPHONE ENCOUNTER
Pharmacist states he just filled the end of September and should be good through the end of the year

## 2019-07-19 ENCOUNTER — OFFICE VISIT (OUTPATIENT)
Dept: FAMILY MEDICINE CLINIC | Age: 59
End: 2019-07-19

## 2019-07-19 VITALS
DIASTOLIC BLOOD PRESSURE: 80 MMHG | HEART RATE: 78 BPM | SYSTOLIC BLOOD PRESSURE: 126 MMHG | WEIGHT: 239.2 LBS | HEIGHT: 73 IN | OXYGEN SATURATION: 97 % | TEMPERATURE: 97 F | RESPIRATION RATE: 18 BRPM | BODY MASS INDEX: 31.7 KG/M2

## 2019-07-19 DIAGNOSIS — F41.0 GENERALIZED ANXIETY DISORDER WITH PANIC ATTACKS: ICD-10-CM

## 2019-07-19 DIAGNOSIS — R25.1 TREMOR DUE TO SUBSTANCE ABUSE: ICD-10-CM

## 2019-07-19 DIAGNOSIS — Z23 ENCOUNTER FOR IMMUNIZATION: ICD-10-CM

## 2019-07-19 DIAGNOSIS — Z79.899 CONTROLLED SUBSTANCE AGREEMENT SIGNED: ICD-10-CM

## 2019-07-19 DIAGNOSIS — Z51.81 ENCOUNTER FOR MEDICATION MONITORING: ICD-10-CM

## 2019-07-19 DIAGNOSIS — F10.20 ALCOHOLISM (HCC): ICD-10-CM

## 2019-07-19 DIAGNOSIS — C78.7 ADENOCARCINOMA OF RECTUM METASTATIC TO LIVER (HCC): ICD-10-CM

## 2019-07-19 DIAGNOSIS — L85.3 DRY SKIN DERMATITIS: ICD-10-CM

## 2019-07-19 DIAGNOSIS — F41.9 ANXIETY: ICD-10-CM

## 2019-07-19 DIAGNOSIS — F41.1 GENERALIZED ANXIETY DISORDER WITH PANIC ATTACKS: ICD-10-CM

## 2019-07-19 DIAGNOSIS — C20 ADENOCARCINOMA OF RECTUM METASTATIC TO LIVER (HCC): ICD-10-CM

## 2019-07-19 DIAGNOSIS — F41.9 TREMOR, ANXIETY RELATED: ICD-10-CM

## 2019-07-19 DIAGNOSIS — R25.1 TREMOR: ICD-10-CM

## 2019-07-19 DIAGNOSIS — I10 BENIGN ESSENTIAL HYPERTENSION: Primary | ICD-10-CM

## 2019-07-19 DIAGNOSIS — R25.1 TREMOR, ANXIETY RELATED: ICD-10-CM

## 2019-07-19 RX ORDER — TRIAMCINOLONE ACETONIDE 5 MG/G
CREAM TOPICAL 2 TIMES DAILY
Qty: 30 G | Refills: 1 | Status: SHIPPED | OUTPATIENT
Start: 2019-07-19 | End: 2020-12-15 | Stop reason: ALTCHOICE

## 2019-07-19 RX ORDER — CLONAZEPAM 0.5 MG/1
TABLET ORAL
Qty: 30 TAB | Refills: 5 | Status: SHIPPED | OUTPATIENT
Start: 2019-07-19 | End: 2020-04-22 | Stop reason: SDUPTHER

## 2019-07-19 NOTE — PATIENT INSTRUCTIONS
Anxiety Disorder: Care Instructions  Your Care Instructions    Anxiety is a normal reaction to stress. Difficult situations can cause you to have symptoms such as sweaty palms and a nervous feeling. In an anxiety disorder, the symptoms are far more severe. Constant worry, muscle tension, trouble sleeping, nausea and diarrhea, and other symptoms can make normal daily activities difficult or impossible. These symptoms may occur for no reason, and they can affect your work, school, or social life. Medicines, counseling, and self-care can all help. Follow-up care is a key part of your treatment and safety. Be sure to make and go to all appointments, and call your doctor if you are having problems. It's also a good idea to know your test results and keep a list of the medicines you take. How can you care for yourself at home? · Take medicines exactly as directed. Call your doctor if you think you are having a problem with your medicine. · Go to your counseling sessions and follow-up appointments. · Recognize and accept your anxiety. Then, when you are in a situation that makes you anxious, say to yourself, \"This is not an emergency. I feel uncomfortable, but I am not in danger. I can keep going even if I feel anxious. \"  · Be kind to your body:  ? Relieve tension with exercise or a massage. ? Get enough rest.  ? Avoid alcohol, caffeine, nicotine, and illegal drugs. They can increase your anxiety level and cause sleep problems. ? Learn and do relaxation techniques. See below for more about these techniques. · Engage your mind. Get out and do something you enjoy. Go to a funny movie, or take a walk or hike. Plan your day. Having too much or too little to do can make you anxious. · Keep a record of your symptoms. Discuss your fears with a good friend or family member, or join a support group for people with similar problems. Talking to others sometimes relieves stress.   · Get involved in social groups, or volunteer to help others. Being alone sometimes makes things seem worse than they are. · Get at least 30 minutes of exercise on most days of the week to relieve stress. Walking is a good choice. You also may want to do other activities, such as running, swimming, cycling, or playing tennis or team sports. Relaxation techniques  Do relaxation exercises 10 to 20 minutes a day. You can play soothing, relaxing music while you do them, if you wish. · Tell others in your house that you are going to do your relaxation exercises. Ask them not to disturb you. · Find a comfortable place, away from all distractions and noise. · Lie down on your back, or sit with your back straight. · Focus on your breathing. Make it slow and steady. · Breathe in through your nose. Breathe out through either your nose or mouth. · Breathe deeply, filling up the area between your navel and your rib cage. Breathe so that your belly goes up and down. · Do not hold your breath. · Breathe like this for 5 to 10 minutes. Notice the feeling of calmness throughout your whole body. As you continue to breathe slowly and deeply, relax by doing the following for another 5 to 10 minutes:  · Tighten and relax each muscle group in your body. You can begin at your toes and work your way up to your head. · Imagine your muscle groups relaxing and becoming heavy. · Empty your mind of all thoughts. · Let yourself relax more and more deeply. · Become aware of the state of calmness that surrounds you. · When your relaxation time is over, you can bring yourself back to alertness by moving your fingers and toes and then your hands and feet and then stretching and moving your entire body. Sometimes people fall asleep during relaxation, but they usually wake up shortly afterward. · Always give yourself time to return to full alertness before you drive a car or do anything that might cause an accident if you are not fully alert.  Never play a relaxation tape while you drive a car. When should you call for help? Call 911 anytime you think you may need emergency care. For example, call if:    · You feel you cannot stop from hurting yourself or someone else.   Best [place] the numbers for these national suicide hotlines: 4-647-589-TALK (1-769.461.3362) and 5-879-CCTJCOL (5-869.450.8197). If you or someone you know talks about suicide or feeling hopeless, get help right away.   Watch closely for changes in your health, and be sure to contact your doctor if:    · You have anxiety or fear that affects your life.     · You have symptoms of anxiety that are new or different from those you had before. Where can you learn more? Go to http://santos-rob.info/. Enter P754 in the search box to learn more about \"Anxiety Disorder: Care Instructions. \"  Current as of: September 11, 2018  Content Version: 11.9  © 8055-0780 [employer]. Care instructions adapted under license by [employer] (which disclaims liability or warranty for this information). If you have questions about a medical condition or this instruction, always ask your healthcare professional. [place] [address] any warranty or liability for your use of this information. Dry Skin: Care Instructions  Your Care Instructions  Dry skin is a common problem, especially in areas where the air is very dry. Dry skin can also become a problem as you get older and lose natural oils that keep your skin moist.  A tendency toward dry, itchy skin may run in families. Some problems with the body's defenses (immune system), allergies, or an infection with a fungus may also cause patches of dry skin. An over-the-counter cream may help your dry skin. If your skin problem does not get better with home treatment, your doctor may prescribe ointment. You may need antibiotics if you have a skin infection. Follow-up care is a key part of your treatment and safety.  Be sure to make and go to all appointments, and call your doctor if you are having problems. It's also a good idea to know your test results and keep a list of the medicines you take. How can you care for yourself at home? Showers and baths  · Keep showers and baths short, and use warm or lukewarm water. Don't use hot water. It takes off more of your skin's natural oils. · Use as little soap as you can. Choose a mild soap, such as Dove, Cetaphil, or Neutrogena. Or use a skin cleanser like Aquanil or Cetaphil. · If you are taking a bath, use soap only at the very end. Then rinse off all traces of soap with fresh water. Gently pat your skin dry with a towel. Skin creams and moisturizers  · Apply moisturizer or skin cream right away (within 3 minutes) after a bath or shower. Use a moisturizer at other times too, as often as you need it. · Moisturizing creams are better than lotions. Try brands like CeraVe cream, Cetaphil cream, or Eucerin cream.  Other tips  · When washing clothes, use a small amount of detergent. Don't use fabric softeners or dryer sheets. · For small areas of itchy skin, try an over-the-counter 1% hydrocortisone cream.  · If you have very dry hands, spread petroleum jelly (such as Vaseline) on your hands before bed. Wear thin cotton gloves while you sleep. If your feet are dry, spread Vaseline on them and wear socks while you sleep. When should you call for help? Call your doctor now or seek immediate medical care if:    · You have signs of infection, such as:  ? Pain, warmth, or swelling in the skin. ? Red streaks near a wound in the skin. ? Pus coming from a wound in your skin. ? A fever.    Watch closely for changes in your health, and be sure to contact your doctor if:    · You do not get better as expected. Where can you learn more? Go to http://santos-rob.info/. Enter O844 in the search box to learn more about \"Dry Skin: Care Instructions. \"  Current as of: April 17, 2018  Content Version: 11.9  © 6308-6362 [employer]. Care instructions adapted under license by [employer] (which disclaims liability or warranty for this information). If you have questions about a medical condition or this instruction, always ask your healthcare professional. [address] any warranty or liability for your use of this information.

## 2019-07-19 NOTE — PROGRESS NOTES
Chief Complaint   Patient presents with    Anxiety     follow up and med check     Medication Evaluation     controlled medication check    Hypertension     follow up       1. Have you been to the ER, urgent care clinic since your last visit? Hospitalized since your last visit? No    2. Have you seen or consulted any other health care providers outside of the [address] [place] [address] since your last visit? Include any pap smears or colon screening.  No

## 2019-07-19 NOTE — PROGRESS NOTES
Chief Complaint   Patient presents with    Anxiety     follow up and med check     Medication Evaluation     controlled medication check    Hypertension     follow up     [address] [facility]   HPI  Patient presents for 6month follow up hypertension, anxiety, tremor, and medication check. He is followed closely at the [facility] for rectal cancer w/ mets to liver, pelvic side wall and now possibly new to lung. He gets [place] battery of labs and scans\" done routinely. He is on chemo. He feels completely zapped and totally stressed out and anxious about all this. He states he is simply just taking this one step/one day at a time and handles things/news as it comes but admits some days it is a lot more to handle than others. Has been feeling more stressed, anxious and nervous the past few months. He takes Klonopin ~ 3-4 nights a week and sometimes during the day if he feels a panic attack coming on. He still drinks 3-6 beers a day. He has tremor related to alcohol and anxiety. He denies overt feelings of depression. He has good support systems. Still enjoys getting out in his yard and doing yardwork. Tired but feels his stamina is pretty good for doing things around the house and in the yard. He also sees cardiologist ~ q6 months. He is maintained on regimen of medications that was updated below. He gets his BP's checked at chemo q 3 weeks and states his readings have been consistently in the 120-130/70-80 range w/ heart rates in the 70's. REVIEW OF SYMPTOMS   Review of Systems   Constitutional: Negative for chills and fever. Eyes: Negative. Respiratory: Negative. Negative for cough and shortness of breath. Cardiovascular: Negative. Negative for chest pain, palpitations and leg swelling. Gastrointestinal: Negative for abdominal pain, blood in stool, constipation, diarrhea, melena, nausea and vomiting.    Skin:        Dry itchy skin on trunk and legs, using otc cortisone, not helping much, requesting rx cortisone which he has used in years past and worked better   Neurological: Negative for dizziness and headaches. Psychiatric/Behavioral: Negative for depression. The patient is nervous/anxious and has insomnia. 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 [facility]) ICD-10-CM: C20, C78.7  ICD-9-CM: 154.1, 197.7  2/6/2018    Overview Addendum 9/20/2018 11:44 AM by Kristen Sultana MD     Onc Dr Chema Thomas, Chemo              Rectal cancer [facility]) ICD-10-CM: C20  ICD-9-CM: 154.1  10/2/2017    Overview Signed 10/2/2017  9:49 PM by Kristen Sultana MD     Colonoscopy: 6 cm mass in rectum, bx +; Dr Harry Cowart;  Onc Dr Oren Cruz             Controlled substance agreement signed ICD-10-CM: Z79.899  ICD-9-CM: V58.69  8/21/2017    Overview Addendum 7/19/2019 10:45 AM by Kristen Sultana MD     7[birth year], 9-20-18, 7-2019             Benign essential hypertension ICD-10-CM: I10  ICD-9-CM: 401.1  10/20/2016        Hypercholesterolemia ICD-10-CM: E78.00  ICD-9-CM: 272.0  10/20/2016        Pre-diabetes ICD-10-CM: R73.03  ICD-9-CM: 790.29  7/7/2013    Overview Signed 7/7/2013 10:54 PM by Kristen Sultana MD     2009             Left Plantar fasciitis ICD-10-CM: M72.2  ICD-9-CM: 728.71  7/20/2012    Overview Signed 7/20/2012 11:57 AM by Kristen Sultana MD     [facility], Ortho ~ 2006; 4/2012             White coat syndrome ICD-10-CM: [id number]  ICD-9-CM: Kacey Josesarai  7/20/2012        Elevated liver enzymes ICD-10-CM: R74.8  ICD-9-CM: 790.5  7/20/2012    Overview Signed 7/20/2012  2:09 PM by Kristen Sultana MD     R/t alcohol             Sinus tachycardia ICD-10-CM: R00.0  ICD-9-CM: 427.89  9/19/2011        Rectal polyp & Internal Hemorrhoids 2010 ICD-10-CM: K62.1  ICD-9-CM: 569.0  11/18/2010    Overview Signed 11/18/2010  1:25 PM by Go Reid MD     Colonoscopy 3/2010 Dr Blaze Schaeffer; f/u 5 yrs             Alcoholism [facility]) ICD-10-CM: F10.20  ICD-9-CM: 303.90  Unknown    Overview Addendum 7/20/2012 12:19 PM by Go Reid MD     [facility] clinical trials             Anxiety ICD-10-CM: F41.9  ICD-9-CM: 300.00  Unknown        Tremor ICD-10-CM: R25.1  ICD-9-CM: 781.0  Unknown        Concentric LVH (left ventricular hypertrophy) ICD-10-CM: I51.7  ICD-9-CM: 429.3  Unknown    Overview Addendum 11/18/2010  1:23 PM by Go Reid MD     May 2008 EF 60%;    Stress tests normal 1997, 2003             H/O Hepatitis C, s/p Tx ICD-10-CM: B19.20  ICD-9-CM: 070.70  Unknown    Overview Addendum 7/20/2012  2:08 PM by Go Reid MD     1997; s/p Interferon and Brynn Petit; Dr Blaze Schaeffer; Liver biopsy 1998 for staging             Genital HSV ICD-10-CM: A60.00  ICD-9-CM: 054.10  4/1/2010    Overview Signed 11/18/2010  1:22 PM by Go Reid MD     1988             Situational stress ICD-10-CM: F43.9  ICD-9-CM: V62.89  4/1/2010    Overview Signed 11/18/2010  1:23 PM by Go Reid MD     2005             S/P colonoscopy with polypectomy ICD-10-CM: A47.922  ICD-9-CM: V45.89  3/15/2010    Overview Signed 4/20/2010  9:47 PM by Go Reid MD     Rectal polyp & internal Hemorrhoids; Dr Blaze Schaeffer; ok x 5yrs                       PAST SURGICAL HISTORY     Past Surgical History:   Procedure Laterality Date    COLONOSCOPY  3/2010    -rectal polyp and intern hemorr-f/u in 5 years    HX CHOLECYSTECTOMY  07/29/2018    HX COLONOSCOPY  08/24/2017, Dr Darion Wyatt diverticulosis in the left side of the colon- 6mm mass in the rectum ( Biopsy)    HX GI  3/1998    Liver biopsy, Dr Wu Mike REPAIR  childhood    BILATERAL     HX ORTHOPAEDIC  1/24/14    Juan Miller resulting in Left hip and pelvic fracture, repair w/ screws; Dr Zach Davis at [facility]  07/29/2018    Radio abalation of liver tumors [address], Dr Missie Boxer HX VASECTOMY  2003    STRESS TEST CARDIAC  2003    Negative ETT         MEDICATIONS     Current Outpatient Medications   Medication Sig    traZODone (DESYREL) 50 mg tablet TAKE 1-1 AND 1/2 TABLETS BY MOUTH NIGHTLY    lisinopril (PRINIVIL, ZESTRIL) 10 mg tablet Take 10 mg by mouth daily.  metoprolol succinate (TOPROL-XL) 100 mg tablet Take 100 mg by mouth daily.  dilTIAZem ER (CARDIZEM LA) 240 mg Tb24 tablet Take 240 mg by mouth daily.  famotidine (PEPCID) 20 mg tablet Take 20 mg by mouth two (2) times a day.  clonazePAM (KLONOPIN) 0.5 mg tablet TAKE 1 TABLET BY MOUTH AT BEDTIME AS NEEDED    capecitabine (XELODA) 150 mg tablet take 2 tablets BY MOUTH with food TWICE DAILY with 4 tabs of 500mg (total DOSE 2300mg TWICE DAILY)  2 weeks on/1 week off    capecitabine (XELODA) 500 mg tablet take 4 tablets BY MOUTH with food TWICE DAILY with 2 tabs of 150mg (total DOSE 2300mg TWICE DAILY)  2 weeks on/1 week off    pseudoephedrine (SUDAFED) 30 mg tablet Take 30 mg by mouth daily. TAKES EVERY MORNING     MULTIVITAMINS (MULTIVITAMIN PO) Take  by mouth daily. No current facility-administered medications for this visit.            ALLERGIES   No Known Allergies       SOCIAL HISTORY     Social History     Socioeconomic History    Marital status:      Spouse name: Not on file    Number of children: 2    Years of education: Not on file    Highest education level: Not on file   Occupational History    Occupation: IT by trade    Occupation: Laid off from [employer] after 25 yrs    Occupation: Laid [employer] more recently   [facility] Occupation: ; : Started new job at [address] [place] [address],  [address] Occupation: On Disability at present   [address] Smoking status: Former Smoker     Packs/day: 0.50     Years: 10.00     Pack years: 5.00     Types: Cigarettes     Last attempt to quit: 1/1/2004     Years since quitting: 15.5    Smokeless tobacco: Never Used   Substance and Sexual Activity    Alcohol use: Yes     Alcohol/week: 37.5 standard drinks     Types: 45 Cans of beer per week     Comment: 6-7 beers a day now; chronic alcoholism, has participated in clinical trials at [facility]; relapses freq; ranges from 2 cans to 12-15 beers a day, when heavier can get up to 20beers in a day; still battling w/ this    Drug use: No    Sexual activity: Yes     Partners: Female   Other Topics Concern    Caffeine Concern No     Comment: rare, just an occ diet coke    Stress Concern No    Special Diet No    Exercise No        IMMUNIZATIONS  Immunization History   Administered Date(s) Administered    TD Vaccine 01/01/2007    Tdap 07/22/2016         FAMILY HISTORY     Family History   Problem Relation Age of Onset    Diabetes Paternal Grandmother     Heart Disease Paternal Grandfather     Hypertension Brother 50    Colon Polyps Mother 78    Breast Cancer Maternal Aunt          VITALS     Visit Vitals  /80 (BP 1 Location: Right arm, BP Patient Position: Sitting)   Pulse 78   Temp 97 °F (36.1 °C) (Oral)   Resp 18   Ht 6' 1\" (1.854 m)   Wt 239 lb 3.2 oz (108.5 kg)   SpO2 97%   BMI 31.56 kg/m²          PHYSICAL EXAMINATION   Physical Exam   Constitutional: He is oriented to person, place, and time. No distress. Neck: Neck supple. Cardiovascular: Normal rate and regular rhythm. Pulmonary/Chest: Effort normal and breath sounds normal.   Abdominal: He exhibits distension. Musculoskeletal: He exhibits no edema. Neurological: He is alert and oriented to person, place, and time. Gait normal. Coordination normal.   Skin: Skin is warm and dry. Psychiatric: Mood and affect normal.   Vitals reviewed. ASSESSMENT & PLAN       ICD-10-CM ICD-9-CM    1.  Benign essential hypertension & tachycardia, controlled, stable on current regimen and seeing cardiology q 6 months I10 401.1    2. Generalized anxiety disorder with panic attacks F41.1 300.02 KLONOPIN 0.5 MG    F41.0 300.01    3. Tremor due to substance abuse R25.1 333.1    4. Tremor, anxiety related R25.1 781.0 KLONOPIN 0.5 MG    F41.9 300.00    5. Alcoholism ([address] F10.20 303.90    6. Encounter for medication monitoring Z51.81 V58.83 TOXASSURE SELECT 13 (MW)   7. Controlled substance agreement signed Z79.899 V58.69    8. Adenocarcinoma of rectum metastatic to liver (HCC) C20 154.1 PNEUMOCOCCAL POLYSACCHARIDE VACCINE, 23-VALENT, ADULT OR IMMUNOSUPPRESSED PT DOSE,    C78.7 197.7    9. Anxiety F41.9 300.00 clonazePAM (KLONOPIN) 0.5 mg tablet   10. Tremor R25.1 781.0 clonazePAM (KLONOPIN) 0.5 mg tablet   11. Dry skin dermatitis L85.3 692.89 triamcinolone (ARISTOCORT) 0.5 % topical cream   12. Encounter for immunization Z23 V03.89 CO IMMUNIZ ADMIN,1 SINGLE/COMB VAC/TOXOID      PNEUMOCOCCAL POLYSACCHARIDE VACCINE, 23-VALENT, ADULT OR IMMUNOSUPPRESSED PT DOSE,     He is also followed closely at the [facility] for rectal cancer w/ mets to liver, pelvic side wall and now possibly new to lung. He gets [place] battery of labs and scans\" done routinely. I reviewed last note from [facility] as well as labs from there and from [address] [place] [address] Ave. Medications reviewed. No changes at this time.  pulled and reviewed. No problems noted. Klonopin last filled 3-2-19 #30. UDS collected today and CSA updated in office today. Patient counseled and we discussed controlled medications, effects, side effects, indications, risks vs benefits, precautions, potential interactions/dangers w/ other medications, illicit drugs, alcohol, abuse potential and the possible negative health implications/risks associated w/ overuse/abuse/misuse of controlled stimulant or sedating medications including overdose and death.  Patient expressed understanding and agreement with current plan of care. Reviewed diet, nutrition, exercise, weight management, BMI/goals, age/risk based screening recommendations, health maintenance & prevention counseling. Cancer screening USPTFS guidelines reviewed w/ pt today. Discussed benefits/positive/negative outcomes of screening based on age/risk stratification. Informed consent for/against screening based on pt's personal hx/risk factors. Updated in history above and health maintenance.    RTC  6 months, follow up sooner prn

## 2019-07-24 LAB — DRUGS UR: NORMAL

## 2020-04-22 DIAGNOSIS — F41.9 ANXIETY: Primary | ICD-10-CM

## 2020-04-22 DIAGNOSIS — R25.1 TREMOR: ICD-10-CM

## 2020-04-22 RX ORDER — CLONAZEPAM 0.5 MG/1
TABLET ORAL
Qty: 30 TAB | Refills: 0 | Status: SHIPPED | OUTPATIENT
Start: 2020-04-22 | End: 2020-06-07

## 2020-04-22 NOTE — TELEPHONE ENCOUNTER
Received fax from pharmacy for refill Last apt 07/19/19 No apt scheduled, was due in Jan 2020. Called patient and set up apt for f/u virtual next week. rx last filled 07/19/19  
 reviewed and appropriate Last fill 12/24/19

## 2020-04-23 NOTE — TELEPHONE ENCOUNTER
reviewed, last fill info: 
12/24/2019  1   07/19/2019  Clonazepam 0.5 MG Tablet  30.00 30 Ta Fou   [id number]   Vir ([id number])   4  1.00 LME  [facility]   [facility]  
11/07/2019  1   07/19/2019  Clonazepam 0.5 MG Tablet  30.00 30 Ta Fou   [id number]   Vir ([id number])   3  1.00 LME  [facility]  
10/07/2019  1   07/19/2019  Clonazepam 0.5 MG Tablet  30.00 30

## 2020-04-29 ENCOUNTER — VIRTUAL VISIT (OUTPATIENT)
Dept: FAMILY MEDICINE CLINIC | Age: 60
End: 2020-04-29

## 2020-04-29 VITALS — WEIGHT: 248 LBS | BODY MASS INDEX: 32.72 KG/M2 | HEART RATE: 84 BPM

## 2020-04-29 DIAGNOSIS — Z51.81 ENCOUNTER FOR MEDICATION MONITORING: ICD-10-CM

## 2020-04-29 DIAGNOSIS — F41.9 ANXIETY: Primary | ICD-10-CM

## 2020-04-29 DIAGNOSIS — R25.1 TREMOR: ICD-10-CM

## 2020-04-29 NOTE — PROGRESS NOTES
Chief Complaint   Patient presents with    Anxiety     follow up    Tremors     follow up    Medication Evaluation     controlled med check on Klonopin       HISTORY OF PRESENT ILLNESS   HPI  Patient seen via virtual visit for 6 month follow up anxiety, tremor, and controlled medication check on Klonopin  He takes Klonopin ~ 3-4 nights a week and sometimes during the day if he feels a panic attack coming on. He still drinks 3-6 beers a day. He has tremor related to alcohol and anxiety. He has good support systems. Still enjoys getting out in his yard and doing yard work from time to time. He is followed closely at the [facility] for rectal cancer w/ mets. He also sees cardiologist Dr. Alfie Carrillo ~ q 6 months for hypertensive heart disease, concentric LVH, and h/o SVT. He is maintained on regimen of medications that was updated below. He gets his BP's checked at chemo q 3 weeks and states his readings have been consistently in the 120-130/70-80 range w/ heart rates in the 70's. He also checks periodically at home w/ readings in the 130's/80's typically, but at times up to 160/90. He takes sudafed several mornings a week for runny nose due to environmental allergies, so I suggested he change to plain Claritin or Allegra instead. He is currently taking Ibuprofen 600 mg tid after hitting his knee when he tripped and fell recently. He is applying ice regularly as well. It is getting much better, so he continues to reduce the IB dosage and will be off of it soon      REVIEW OF SYMPTOMS   Review of Systems   Constitutional: Negative. Respiratory: Negative. Cardiovascular: Negative. Gastrointestinal: Negative for abdominal pain, nausea and vomiting. Neurological: Negative for dizziness and headaches. Psychiatric/Behavioral: Negative for depression.            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 [facility]) ICD-10-CM: C20, C78.7  ICD-9-CM: 154.1, 197.7  2/6/2018    Overview Addendum 9/20/2018 11:44 AM by Hugh Shell MD     Onc Dr Creighton Mohs, Chemo              Rectal cancer [facility]) ICD-10-CM: C20  ICD-9-CM: 154.1  10/2/2017    Overview Signed 10/2/2017  9:49 PM by Hugh Shell MD     Colonoscopy: 6 cm mass in rectum, bx +; Dr Ysabel Pena;  Onc Dr Jitendra Baldwin             Controlled substance agreement signed ICD-10-CM: Z79.899  ICD-9-CM: V58.69  8/21/2017    Overview Addendum 7/19/2019 10:45 AM by Hugh Shell MD     8-57-93, 9-20-18, 7-2019             Benign essential hypertension ICD-10-CM: I10  ICD-9-CM: 401.1  10/20/2016        Hypercholesterolemia ICD-10-CM: E78.00  ICD-9-CM: 272.0  10/20/2016        Pre-diabetes ICD-10-CM: R73.03  ICD-9-CM: 790.29  7/7/2013    Overview Signed 7/7/2013 10:54 PM by Hugh Shell MD     2009             Left Plantar fasciitis ICD-10-CM: M72.2  ICD-9-CM: 728.71  7/20/2012    Overview Signed 7/20/2012 11:57 AM by Hugh Shell MD     [facility], Ortho ~ 2006; 4/2012             White coat syndrome ICD-10-CM: Brenda Mink  ICD-9-CM: Brenda Mink  7/20/2012        Elevated liver enzymes ICD-10-CM: R74.8  ICD-9-CM: 790.5  7/20/2012    Overview Signed 7/20/2012  2:09 PM by Hugh Shell MD     R/t alcohol             Sinus tachycardia ICD-10-CM: R00.0  ICD-9-CM: 427.89  9/19/2011        Rectal polyp & Internal Hemorrhoids 2010 ICD-10-CM: K62.1  ICD-9-CM: 569.0  11/18/2010    Overview Signed 11/18/2010  1:25 PM by Hugh Shell MD     Colonoscopy 3/2010 Dr Jenny Herrera; f/u 5 yrs             Alcoholism [facility]) ICD-10-CM: F10.20  ICD-9-CM: 303.90  Unknown    Overview Addendum 7/20/2012 12:19 PM by Hugh Shell, MD     [facility] clinical trials             Anxiety ICD-10-CM: F41.9  ICD-9-CM: 300.00  Unknown        Tremor ICD-10-CM: R25.1  ICD-9-CM: 781.0  Unknown        Concentric LVH (left ventricular hypertrophy) ICD-10-CM: I51.7  ICD-9-CM: 429.3  Unknown    Overview Addendum 11/18/2010  1:23 PM by Martín Castro MD     May 2008 EF 60%;    Stress tests normal 1997, 2003             H/O Hepatitis C, s/p Tx ICD-10-CM: B19.20  ICD-9-CM: 070.70  Unknown    Overview Addendum 7/20/2012  2:08 PM by Martín Castro MD     1997; s/p Interferon and Lovina Arrow; Dr Renetta Rausch; Liver biopsy 1998 for staging             Genital HSV ICD-10-CM: A60.00  ICD-9-CM: 054.10  4/1/2010    Overview Signed 11/18/2010  1:22 PM by Martín Castro MD     1988             Situational stress ICD-10-CM: F43.9  ICD-9-CM: V62.89  4/1/2010    Overview Signed 11/18/2010  1:23 PM by Martín Castro MD     2005             S/P colonoscopy with polypectomy ICD-10-CM: O42.034  ICD-9-CM: V45.89  3/15/2010    Overview Signed 4/20/2010  9:47 PM by Martín Castro MD     Rectal polyp & internal Hemorrhoids; Dr Renetta Rausch; ok x 5yrs                       PAST SURGICAL HISTORY     Past Surgical History:   Procedure Laterality Date    COLONOSCOPY  3/2010    -rectal polyp and intern hemorr-f/u in 5 years    HX CHOLECYSTECTOMY  07/29/2018    HX COLONOSCOPY  08/24/2017, Dr Boyd Neither diverticulosis in the left side of the colon- 6mm mass in the rectum ( Biopsy)    HX GI  3/1998    Liver biopsy, Dr Merly King  childhood    BILATERAL     HX ORTHOPAEDIC  1/24/14    Alonza Geovanna resulting in Left hip and pelvic fracture, repair w/ screws; Dr Marcus Swenson at [address] [place] [address]  07/29/2018    Radio abalation of liver tumors [address], Dr Juan Miguel Craig HX VASECTOMY  2003   [address]  2003    Negative ETT         MEDICATIONS     Current Outpatient Medications   Medication Sig    clonazePAM (KlonoPIN) 0.5 mg tablet TAKE 1 TABLET BY MOUTH AT BEDTIME AS NEEDED    traZODone (DESYREL) 50 mg tablet TAKE 1-1 AND 1/2 TABLETS BY MOUTH NIGHTLY    triamcinolone (ARISTOCORT) 0.5 % topical cream Apply  to affected area two (2) times a day. Apply thin layer to affected areas twice daily as needed for dry, itchy skin    lisinopril (PRINIVIL, ZESTRIL) 10 mg tablet Take 10 mg by mouth daily.  metoprolol succinate (TOPROL-XL) 100 mg tablet Take 100 mg by mouth daily.  dilTIAZem ER (CARDIZEM LA) 240 mg Tb24 tablet Take 240 mg by mouth daily.  famotidine (PEPCID) 20 mg tablet Take 20 mg by mouth two (2) times a day.  capecitabine (XELODA) 150 mg tablet take 2 tablets BY MOUTH with food TWICE DAILY with 4 tabs of 500mg (total DOSE 2300mg TWICE DAILY)  2 weeks on/1 week off    capecitabine (XELODA) 500 mg tablet take 4 tablets BY MOUTH with food TWICE DAILY with 2 tabs of 150mg (total DOSE 2300mg TWICE DAILY)  2 weeks on/1 week off    pseudoephedrine (SUDAFED) 30 mg tablet Take 30 mg by mouth daily. TAKES EVERY MORNING     MULTIVITAMINS (MULTIVITAMIN PO) Take  by mouth daily. No current facility-administered medications for this visit.            ALLERGIES   No Known Allergies       SOCIAL HISTORY     Social History     Socioeconomic History    Marital status:      Spouse name: Not on file    Number of children: 2    Years of education: Not on file    Highest education level: Not on file   Occupational History    Occupation: IT by trade    Occupation: Laid off from [address] [place] [address] after 25 yrs    Occupation: Laid [employer] more recently   Shanae Cordova Occupation: ; : Started new job at [address] [place] [address] Occupation: On Disability at present   Tobacco Use    Smoking status: Former Smoker     Packs/day: 0.50     Years: 10.00     Pack years: 5.00     Types: Cigarettes     Last attempt to quit: 2004     Years since quittin.3    Smokeless tobacco: Never Used   Substance and Sexual Activity    Alcohol use: Yes     Alcohol/week: 37.5 standard drinks     Types: 45 Cans of beer per week     Comment: 6-7 beers a day now; chronic alcoholism, has participated in clinical trials at [facility]; relapses freq; ranges from 2 cans to 12-15 beers a day, when heavier can get up to 20beers in a day; still battling w/ this    Drug use: No    Sexual activity: Yes     Partners: Female   Other Topics Concern    Caffeine Concern No     Comment: rare, just an occ diet coke    Stress Concern No    Special Diet No    Exercise No        IMMUNIZATIONS  Immunization History   Administered Date(s) Administered    Pneumococcal Polysaccharide (PPSV-23) 07/19/2019    TD Vaccine 01/01/2007    Tdap 07/22/2016         FAMILY HISTORY     Family History   Problem Relation Age of Onset    Diabetes Paternal Grandmother     Heart Disease Paternal Grandfather     Hypertension Brother 50    Colon Polyps Mother 78    Breast Cancer Maternal Aunt          VITALS   Vitals limited due to virtual visit. Self reported data collected today:  Visit Vitals  Pulse 84   Wt 248 lb (112.5 kg)   BMI 32.72 kg/m²          PHYSICAL EXAMINATION   Physical Exam  Vitals signs reviewed. Constitutional:       General: He is not in acute distress. Cardiovascular:      Rate and Rhythm: Normal rate. Pulmonary:      Effort: Pulmonary effort is normal. No respiratory distress. Neurological:      Mental Status: He is alert and oriented to person, place, and time. Mental status is at baseline. Psychiatric:         Mood and Affect: Mood normal.         Behavior: Behavior normal.         Thought Content: Thought content normal.         Judgment: Judgment normal.             DIAGNOSTIC DATA         LABORATORY DATA     Results for orders placed or performed in visit on 07/19/19   TOXASSURE SELECT 13 (MW)   Result Value Ref Range    Summary FINAL           ASSESSMENT & PLAN       ICD-10-CM ICD-9-CM    1. Anxiety F41.9 300.00    2.  Tremor R25.1 781.0    3. Encounter for medication monitoring Z51.81 V58.83      Patient seen via virtual visit today for 6 month follow up anxiety, tremor, and controlled medication check on Klonopin. He has tremor related to alcohol and anxiety. Stable on current regimen of Klonopin 0.5 mg once a day which he typically takes ~ 3-4 days a week. He also sees his cardiologist Dr. Candido Cruz ~ q 6 months for hypertensive heart disease, concentric LVH, and h/o SVT. His next follow up appt there is next week. He is followed closely at the [facility] for rectal cancer w/ mets. He gets labs done there q 3 weeks and dropped off a copy of his most recent test results at the office earlier this week. Anxiety, alcoholism, tremor and controlled medication counseling. UDS and CSA on file 7-2019.  pulled and reviewed. No problems noted. Klonopin last filled #30 on 4-22-20. He is not in need of refills at this time, but next renewal ok to extend to 5 additional refills. Patient counseled and we discussed controlled medications, effects, side effects, indications, risks vs benefits, precautions, potential interactions/dangers w/ other medications, illicit drugs, abuse potential and the possible negative health implications/risks associated w/ overuse/abuse/misuse of controlled stimulant or sedating medications including overdose and death. Patient expressed understanding and agreement with current plan of care. Reviewed diet, nutrition, exercise, weight management, BMI/goals, age/risk based screening recommendations, health maintenance & prevention counseling. Cancer screening USPTFS guidelines reviewed w/ pt today. Discussed benefits/positive/negative outcomes of screening based on age/risk stratification. Informed consent for/against screening based on pt's personal hx/risk factors. Updated in history above and health maintenance.    Follow up in 6 months, sooner prn  ----------------------------------------------------------------------------------------------------------------------------------------------------------  Patient is being evaluated by a video visit encounter for concerns as above. A caregiver was present when appropriate. Due to this being a TeleHealth encounter (During XLRYJ-58 public health emergency), evaluation of the following organ systems was limited: Vitals/Constitutional/EENT/Resp/CV/GI//MS/Neuro/Skin/Heme-Lymph-Imm. Pursuant to the emergency declaration under the [address] [place] [address], [facility] waiver authority and the [employer] Act, this Virtual  Visit was conducted, with patient's (and/or legal guardian's) consent, to reduce the patient's risk of exposure to COVID-19 and provide necessary medical care. Services were provided through a video synchronous discussion virtually to substitute for in-person clinic visit. Patient was located at their own home. I was at home while conducting this encounter. Consent:  He and/or his healthcare decision maker is aware that this patient-initiated Telehealth encounter is a billable service, with coverage as determined by his insurance carrier. He is aware that he may receive a bill and has provided verbal consent to proceed: Yes  This virtual visit was conducted via [address] Pursuant to the emergency declaration under the [address] [place] [address], [facility] waiver authority and the [employer] Act, this Virtual  Visit was conducted to reduce the patient's risk of exposure to COVID-19 and provide continuity of care for an established patient. Services were provided through a video synchronous discussion virtually to substitute for in-person clinic visit.   Due to this being a TeleHealth evaluation, many elements of the physical examination are unable to be assessed. Total Time: minutes: 11-20 minutes.

## 2020-06-06 DIAGNOSIS — F41.9 ANXIETY: ICD-10-CM

## 2020-06-06 DIAGNOSIS — R25.1 TREMOR: ICD-10-CM

## 2020-06-07 RX ORDER — CLONAZEPAM 0.5 MG/1
TABLET ORAL
Qty: 30 TAB | Refills: 5 | Status: SHIPPED | OUTPATIENT
Start: 2020-06-07 | End: 2021-03-09

## 2020-11-11 RX ORDER — TRAZODONE HYDROCHLORIDE 50 MG/1
TABLET ORAL
Qty: 135 TAB | Refills: 0 | Status: CANCELLED | OUTPATIENT
Start: 2020-11-11

## 2020-11-11 NOTE — TELEPHONE ENCOUNTER
Last visit 04/29/2020 Virtual visit MD Cassidy Hansen Next appointment 6 months (10/2020) - Nothing scheduled Previous refill encounter(s)  
09/18/2019 Desyrel #135 with 3 refills. Requested Prescriptions Pending Prescriptions Disp Refills  traZODone (DESYREL) 50 mg tablet 135 Tab 0 Sig: TAKE 1-1 AND 1/2 TABLETS BY MOUTH NIGHTLY

## 2020-11-11 NOTE — TELEPHONE ENCOUNTER
PCP: Tahir Avila MD 
 
Last appt: 4/29/2020 No future appointments. Requested Prescriptions Pending Prescriptions Disp Refills  traZODone (DESYREL) 50 mg tablet 135 Tab 3 Sig: TAKE 1-1 AND 1/2 TABLETS BY MOUTH NIGHTLY

## 2020-11-12 NOTE — TELEPHONE ENCOUNTER
Patient last seen for virtual visit w/ me 4-2020. Was to have 6 month follow up in office in . Never followed up. Nothing scheduled. I am getting 90 day supply refill request. 
Please get pt scheduled in office asap and see if he will have enough to last til then. Thanks.

## 2020-12-15 ENCOUNTER — OFFICE VISIT (OUTPATIENT)
Dept: FAMILY MEDICINE CLINIC | Age: 60
End: 2020-12-15
Payer: COMMERCIAL

## 2020-12-15 VITALS
DIASTOLIC BLOOD PRESSURE: 80 MMHG | TEMPERATURE: 98.4 F | SYSTOLIC BLOOD PRESSURE: 132 MMHG | WEIGHT: 227.4 LBS | HEART RATE: 97 BPM | OXYGEN SATURATION: 99 % | HEIGHT: 73 IN | RESPIRATION RATE: 18 BRPM | BODY MASS INDEX: 30.14 KG/M2

## 2020-12-15 DIAGNOSIS — Z79.899 CONTROLLED SUBSTANCE AGREEMENT SIGNED: ICD-10-CM

## 2020-12-15 DIAGNOSIS — R25.1 TREMOR: ICD-10-CM

## 2020-12-15 DIAGNOSIS — F41.9 ANXIETY: ICD-10-CM

## 2020-12-15 DIAGNOSIS — R73.03 PRE-DIABETES: ICD-10-CM

## 2020-12-15 DIAGNOSIS — F51.05 HYPOSOMNIA, INSOMNIA OR SLEEPLESSNESS ASSOCIATED WITH ANXIETY: ICD-10-CM

## 2020-12-15 DIAGNOSIS — Z51.81 ENCOUNTER FOR MEDICATION MONITORING: ICD-10-CM

## 2020-12-15 DIAGNOSIS — R00.0 SINUS TACHYCARDIA: ICD-10-CM

## 2020-12-15 DIAGNOSIS — I10 BENIGN ESSENTIAL HYPERTENSION: Primary | ICD-10-CM

## 2020-12-15 DIAGNOSIS — F41.9 HYPOSOMNIA, INSOMNIA OR SLEEPLESSNESS ASSOCIATED WITH ANXIETY: ICD-10-CM

## 2020-12-15 DIAGNOSIS — Z79.899 ENCOUNTER FOR MEDICATION MANAGEMENT: ICD-10-CM

## 2020-12-15 LAB — HBA1C MFR BLD HPLC: 5.4 %

## 2020-12-15 PROCEDURE — 83036 HEMOGLOBIN GLYCOSYLATED A1C: CPT | Performed by: FAMILY MEDICINE

## 2020-12-15 PROCEDURE — 99214 OFFICE O/P EST MOD 30 MIN: CPT | Performed by: FAMILY MEDICINE

## 2020-12-15 RX ORDER — TRAZODONE HYDROCHLORIDE 50 MG/1
TABLET ORAL
Qty: 135 TAB | Refills: 1 | Status: SHIPPED | OUTPATIENT
Start: 2020-12-15 | End: 2021-07-16

## 2020-12-15 RX ORDER — MINOCYCLINE HYDROCHLORIDE 100 MG/1
100 TABLET ORAL DAILY
COMMUNITY
Start: 2020-12-10

## 2020-12-15 RX ORDER — ESCITALOPRAM OXALATE 20 MG/1
20 TABLET ORAL DAILY
COMMUNITY
Start: 2020-11-28

## 2020-12-15 RX ORDER — REGORAFENIB 40 MG/1
80 TABLET, FILM COATED ORAL DAILY
COMMUNITY
Start: 2020-12-03

## 2020-12-15 RX ORDER — HYDROCODONE BITARTRATE AND ACETAMINOPHEN 5; 325 MG/1; MG/1
1 TABLET ORAL
COMMUNITY

## 2020-12-15 RX ORDER — DILTIAZEM HYDROCHLORIDE 240 MG/1
240 CAPSULE, EXTENDED RELEASE ORAL DAILY
COMMUNITY
Start: 2020-11-28 | End: 2020-12-15 | Stop reason: SDUPTHER

## 2020-12-15 NOTE — PROGRESS NOTES
Chief Complaint   Patient presents with    Anxiety    Insomnia    Medication Management    Hypertension    Pre-diabetes       HISTORY OF PRESENT ILLNESS   HPI  Follow up anxiety, essential tremor, insomnia and controlled medication check. He has been prescribed Klonopin, Toprol and Trazodone by me over the years for management of all the above. He is also currently on Lexapro now started by his oncologist w/in the past year. Feels like this regimen is keeping him stabilized. He takes the Klonopin ~ 3-4 days a week for acute anxiety. He has been off the Trazodone a few weeks and so far he has done pretty well w/o it but would like to go ahead and renew it in case his sleeplessness worsens again. His tremor has been well controlled. His BP's have been good. He sees cardiologist Dr. John Modi only annually now, last check was about a week ago, no changes made at that time  He has also had a Cardiac CT and was told it was ok. He cannot recall the calcium score. His cholesterol has always been really good. He has stage IV rectal cancer w/ liver mets on regimen below per oncologist Dr. Shawn Conley who he sees monthly and gets Opdivo infusions q 2 weeks now. He gets blood work done there q 2 weeks and brought in a copy of his most recent labs and CT scans. His liver enzymes are stable. He has been trying to eat a bit healthier and has gotten his wt down about 10 lbs. REVIEW OF SYMPTOMS   Review of Systems   Constitutional: Negative for chills and fever. Respiratory: Negative for cough and shortness of breath. Cardiovascular: Negative for chest pain and palpitations. Gastrointestinal: Negative for abdominal pain, nausea and vomiting. Genitourinary: Negative. Neurological: Negative for dizziness and headaches.            PROBLEM LIST/MEDICAL HISTORY     Problem List  Date Reviewed: 12/15/2020          Codes Class Noted    [facility] ([facility] ICD-10-CM: I97.89, I47.1  ICD-9-CM: 997.1  9/20/2018    Overview Signed 9/20/2018  8:50 AM by Alpa Dejesus MD     [facility] [place], [place] 7-29-18, Cardio Dr Nadege Keane             Adenocarcinoma of rectum metastatic to liver [facility]) ICD-10-CM: C20, C78.7  ICD-9-CM: 154.1, 197.7  2/6/2018    Overview Addendum 9/20/2018 11:44 AM by Alpa Dejesus MD     Onc Dr Beti Kim, Chemo              Rectal cancer [facility]) ICD-10-CM: C20  ICD-9-CM: 154.1  10/2/2017    Overview Addendum 12/15/2020  2:14 PM by Alpa Dejesus MD     Colonoscopy: 6 cm mass in rectum, bx +; Dr Antonia Biggs;  Onc Dr. Mason Phillips             Controlled substance agreement signed ICD-10-CM: Z79.899  ICD-9-CM: V58.69  8/21/2017    Overview Addendum 7/19/2019 10:45 AM by Alpa Dejesus MD     [birth year], 9-20-18, 7-2019             Benign essential hypertension ICD-10-CM: I10  ICD-9-CM: 401.1  10/20/2016        Hypercholesterolemia ICD-10-CM: E78.00  ICD-9-CM: 272.0  10/20/2016        Pre-diabetes ICD-10-CM: R73.03  ICD-9-CM: 790.29  7/7/2013    Overview Signed 7/7/2013 10:54 PM by Alpa Dejesus MD     2009             Left Plantar fasciitis ICD-10-CM: M72.2  ICD-9-CM: 728.71  7/20/2012    Overview Signed 7/20/2012 11:57 AM by Alpa Dejesus MD     [facility], Ortho ~ 2006; 4/2012             White coat syndrome ICD-10-CM: Mary Rathke  ICD-9-CM: Mary Rathke  7/20/2012        Elevated liver enzymes ICD-10-CM: R74.8  ICD-9-CM: 790.5  7/20/2012    Overview Signed 7/20/2012  2:09 PM by Alpa Dejesus MD     R/t alcohol             Sinus tachycardia ICD-10-CM: R00.0  ICD-9-CM: 427.89  9/19/2011        Rectal polyp & Internal Hemorrhoids 2010 ICD-10-CM: K62.1  ICD-9-CM: 569.0  11/18/2010    Overview Signed 11/18/2010  1:25 PM by Alpa Dejesus MD     Colonoscopy 3/2010 Dr Christine Warner; f/u 5 yrs             Alcoholism [facility]) ICD-10-CM: F10.20  ICD-9-CM: 303.90  Unknown    Overview Addendum 7/20/2012 12:19 PM by Alpa Dejesus MD     [facility] clinical trials             Anxiety ICD-10-CM: F41.9  ICD-9-CM: 300.00  Unknown        Tremor ICD-10-CM: R25.1  ICD-9-CM: 781.0  Unknown        Concentric LVH (left ventricular hypertrophy) ICD-10-CM: I51.7  ICD-9-CM: 429.3  Unknown    Overview Addendum 11/18/2010  1:23 PM by Suly Sullivan MD     May 2008 EF 60%;    Stress tests normal 1997, 2003             H/O Hepatitis C, s/p Tx ICD-10-CM: B19.20  ICD-9-CM: 070.70  Unknown    Overview Addendum 7/20/2012  2:08 PM by Suly Sullivan MD     1997; s/p Interferon and Amirah Roses; Dr Nitish Tao; Liver biopsy 1998 for staging             Genital HSV ICD-10-CM: A60.00  ICD-9-CM: 054.10  4/1/2010    Overview Signed 11/18/2010  1:22 PM by Suly Sullivan MD     1988             Situational stress ICD-10-CM: F43.9  ICD-9-CM: V62.89  4/1/2010    Overview Signed 11/18/2010  1:23 PM by Suly Sullivan MD     2005             S/P colonoscopy with polypectomy ICD-10-CM: E64.350  ICD-9-CM: V45.89  3/15/2010    Overview Signed 4/20/2010  9:47 PM by Suly Sullivan MD     Rectal polyp & internal Hemorrhoids; Dr Nitish Tao; ok x 5yrs                       PAST SURGICAL HISTORY     Past Surgical History:   Procedure Laterality Date    COLONOSCOPY  3/2010    -rectal polyp and intern hemorr-f/u in 5 years    HX CHOLECYSTECTOMY  07/29/2018    HX COLONOSCOPY  08/24/2017, Dr Shasta Quinn diverticulosis in the left side of the colon- 6mm mass in the rectum ( Biopsy)    HX GI  3/1998    Liver biopsy, Dr Armani Ansari  childhood    BILATERAL     HX ORTHOPAEDIC  1/24/14    [facility] resulting in Left hip and pelvic fracture, repair w/ screws; Dr Sergio Villeda at [address]  07/29/2018    Radio abalation of liver tumors [address], Dr Isabelle Bishop HX VASECTOMY  2003   [address]  2003    Negative ETT         MEDICATIONS     Current Outpatient Medications   Medication Sig    Stivarga 40 mg tablet Take 80 mg by mouth daily.  minocycline (DYNACIN) 100 mg tablet Take 100 mg by mouth daily. Per oncologist for medication induced dermatoses    escitalopram oxalate (LEXAPRO) 20 mg tablet Take 20 mg by mouth daily. Per Onc Dr. Asaf Stahl cholecalciferol, vitamin D3, (VITAMIN D3 PO) Take  by mouth daily.  HYDROcodone-acetaminophen (NORCO) 5-325 mg per tablet Take 1 Tab by mouth every six (6) hours as needed for Pain. Per Onc Dr. Asaf Stahl nivolumab (OPDIVO IV) by IntraVENous route every fourteen (14) days. Per Oncology Dr. Asaf Stahl traZODone (DESYREL) 50 mg tablet TAKE 1-1 AND 1/2 TABLETS BY MOUTH NIGHTLY    clonazePAM (KlonoPIN) 0.5 mg tablet TAKE 1 TABLET BY MOUTH EVERY DAY AT BEDTIME AS NEEDED    lisinopril (PRINIVIL, ZESTRIL) 10 mg tablet Take 10 mg by mouth daily. Per [facility]io Dr[facility] at [facility]    metoprolol succinate (TOPROL-XL) 100 mg tablet Take 100 mg by mouth daily. Per Cardio Dr. Junior Dias at [facility]    dilTIAZem ER (CARDIZEM LA) 240 mg Tb24 tablet Take 240 mg by mouth daily. Per Cardio Dr. Junior Dias at [facility]    famotidine (PEPCID) 20 mg tablet Take 20 mg by mouth two (2) times a day.  MULTIVITAMINS (MULTIVITAMIN PO) Take  by mouth daily. No current facility-administered medications for this visit.            ALLERGIES   No Known Allergies       SOCIAL HISTORY     Social History     Socioeconomic History    Marital status:      Spouse name: Not on file    Number of children: 2    Years of education: Not on file    Highest education level: Not on file   Occupational History    Occupation: IT by trade    Occupation: Laid off from [employer] after 25 yrs    Occupation: Laid [employer] more recently   Juan Mendezmond Occupation: ; : Started new job at [address] [place] [address] Occupation: On Disability at present   Tobacco Use    Smoking status: Former Smoker     Packs/day: 0.50     Years: 10.00     Pack years: 5.00     Types: Cigarettes     Last attempt to quit: 1/1/2004     Years since quittin.9    Smokeless tobacco: Never Used   Substance and Sexual Activity    Alcohol use: Yes     Alcohol/week: 37.5 standard drinks     Types: 45 Cans of beer per week     Comment: ranges anywhere from 0-12 drinks a day various days of the week; chronic alcoholism, has participated in clinical trials at [facility]; relapses freq; ranges from 2 cans to 12-15 beers a day, when heavier can get up to 20beers in a day; still battling w/ this    Drug use: Yes     Types: Marijuana    Sexual activity: Yes     Partners: Female   Other Topics Concern    Caffeine Concern No     Comment: rare, just an occ diet coke    Stress Concern No    Special Diet Yes     Comment: cut back on portions    Exercise No        IMMUNIZATIONS  Immunization History   Administered Date(s) Administered    Influenza Vaccine (Mdck Quadrivalent)(>4 Yrs Flucelvax Quad vial 28975) 2020    Pneumococcal Polysaccharide (PPSV-23) 2019    TD Vaccine 2007    Tdap 2016         FAMILY HISTORY     Family History   Problem Relation Age of Onset    Diabetes Paternal Grandmother     Heart Disease Paternal Grandfather     Hypertension Brother 50    Colon Polyps Mother 78    Breast Cancer Maternal Aunt          VITALS     Visit Vitals  /80 (BP 1 Location: Left arm, BP Patient Position: Sitting)   Pulse 97   Temp 98.4 °F (36.9 °C) (Temporal)   Resp 18   Ht 6' 1\" (1.854 m)   Wt 227 lb 6.4 oz (103.1 kg)   SpO2 99%   BMI 30.00 kg/m²          PHYSICAL EXAMINATION   Physical Exam  Vitals signs reviewed. Constitutional:       General: He is not in acute distress. Cardiovascular:      Rate and Rhythm: Regular rhythm. Heart sounds: No murmur. Pulmonary:      Effort: Pulmonary effort is normal.      Breath sounds: Normal breath sounds. Abdominal:      General: There is distension. Musculoskeletal:      Right lower leg: No edema. Left lower leg: No edema. Skin:     General: Skin is warm and dry. Neurological:      General: No focal deficit present. Mental Status: He is oriented to person, place, and time. Mental status is at baseline. Motor: Motor function is intact. No tremor. Coordination: Coordination is intact. Psychiatric:         Mood and Affect: Mood normal.         Behavior: Behavior normal.                 ASSESSMENT & PLAN       ICD-10-CM ICD-9-CM    1. Benign essential hypertension, controlled, stable  I10 401.1    2. Sinus tachycardia, stable  R00.0 427.89    3. Pre-diabetes  R73.03 790.29 AMB POC HEMOGLOBIN A1C: 5.4   4. Anxiety  F41.9 300.00    5. Tremor  R25.1 781.0    6. Hyposomnia, insomnia or sleeplessness associated with anxiety  F51.05 293.84 traZODone (DESYREL) 50 mg tablet    F41.9 327.02    7. Encounter for medication monitoring  Z51.81 V58.83 TOXASSURE SELECT 13 (MW)      TOXASSURE SELECT 13 (MW)   8. Encounter for medication management  Z79.899 V58.69    9. Controlled substance agreement signed  Z79.899 V58.69      Stable. Reviewed diet, nutrition, exercise, weight management, BMI/goals. Age/risk based screening recommendations, health maintenance & prevention counseling. Cancer screening USPTFS guidelines reviewed w/ pt today. Discussed benefits/positive/negative outcomes of screening based on age/risk stratification. Informed consent for/against screening based on pt's personal hx/risk factors. Updated in history above and health maintenance. Sees cardiologist Dr. Bang Khan annually, last visit there 1 week ago, stable, no changes made at that time. Sees oncologist Dr. Sams Drafts monthly for h/o stage IV rectal cancer w/ liver mets, on infusion therapy and gets labs done there q 2 weeks. He brought in most recent copies of his labs and CT scan reports. Reviewed medications and side effects in detail  Anxiety, sleep health, alcoholism, controlled medication management counseling   pulled and reviewed.   Controlled Substance Agreement reviewed, signed, copy filed to scan. UDS collected today  Patient counseled and we discussed controlled medications, effects, side effects, indications, risks vs benefits, precautions, potential interactions/dangers w/ other medications, illicit drugs, abuse potential and the possible negative health implications/risks associated w/ overuse/abuse/misuse of controlled stimulant or sedating medications including overdose and death. Patient expressed understanding and agreement with current plan of care.   Follow up 6 months, sooner prn

## 2020-12-15 NOTE — LETTER
Name:Courtney Pope :1960 MR #:[id number] [address] 17 Page 1 of 5 CONTROLLED SUBSTANCE AGREEMENT I may be prescribed medications that are controlled substances as part  of my treatment plan for management of my medical condition(s). The goal of my treatment plan is to maintain and/or improve my health and wellbeing. Because controlled substances have an increased risk of abuse or harm, continual re-evaluation is needed determine if the goals of my treatment plan are being met for my safety and the safety of others. Cyndie Herring  am entering into this Controlled Substance Agreement with my provider, Dr. Agustin White at [place] [address] . I understand that successful treatment requires mutual trust and honesty between me and my provider. I understand that there are state and federal laws and regulations which apply to the medications that my provider may prescribe that must be followed. I understand there are risks and benefits ts of taking the medicines that my provider may prescribe. I understand and agree that following this Agreement is necessary in continuing my provider-patient relationship and success of my treatment plan. As a part of my treatment plan, I agree to the following: COMMUNICATION: 
 
1. I will communicate fully with my provider about my medical condition(s), including the effect on my daily life and how well my medications are helping. I will tell my provider all of the medications that I take for any reason, including medications I receive from another health care provider, and will notify my provider about all issues, problems or concerns, including any side effects, which may be related to my medications. I understand that this information allows my provider to adjust my treatment plan to help manage my medical condition.  I understand that this information will become part of my permanent medical record. 2. I will notify my provider if I have a history of alcohol/drug misuse/addiction or if I have had treatment for alcohol/drug addiction in the past, or if I have a new problem with or concern about alcohol/drug use/addiction, because this increases the likelihood of high risk behaviors and may lead to serious medical conditions. 3. Females Only: I will notify my provider if I am or become pregnant, or if I intend to become pregnant, or if I intend to breastfeed. I understand that communication of these issues with my provider is important, due to possible effects my medication could have on an unborn fetus or breastfeeding child. Initials_____ Name:.Han BUTLER Shyann Paige :1960 MR #:[id number] [address] 17 Page 2 of 5 MISUSE OF MEDICATIONS / DRUGS: 
 
1. I agree to take all controlled substances as prescribed, and will not misuse or abuse any controlled substances prescribed by my provider. For my safety, I will not increase the amount of medicine I take without first talking with and getting permission from my provider. 2. If I have a medical emergency, another health care provider may prescribe me medication. If I seek emergency treatment, I will notify my provider within seventy-two (72) hours. 3. I understand that my provider may discuss my use and/or possible misuse/abuse of controlled substances and alcohol, as appropriate, with any health care provider involved in my care, pharmacist or legal authority. ILLEGAL DRUGS: 
 
1. I will not use illegal drugs of any kind, including but not limited to marijuana, heroin, cocaine, or any prescription drug which is not prescribed to me. DRUG DIVERSION / PRESCRIPTION FRAUD: 
 
1. I will not share, sell, trade, give away, or otherwise misuse my prescriptions or medications. 2. I will not alter any prescriptions provided to me by my provider. SINGLE PROVIDER: 
 
1. I agree that all controlled substances that I take will be prescribed only by my provider (or his/her covering provider) under this Agreement. This agreement does not prevent me from seeking emergency medical treatment or receiving pain management related to a surgery. PROTECTING MEDICATIONS: 
 
1. I am responsible for keeping my prescriptions and medications in a safe and secure place including safeguarding them from loss or theft. I understand that lost, stolen or damaged/destroyed prescriptions or medications will not be replaced. Initials____ Name:.Han Tolbert :1960 MR #:[id number] Frank 17 Page 3 of 5 PRESCRIPTION RENEWALS/REFILLS: 
 
1. I will follow my controlled substance medication schedule as prescribed by my provider. 2. I understand and agree that I will make any requests for renewals or refills of my prescriptions only at the time of an office visit or during my providers regular office hours subject to the prescription refill requirements of the individual practice. 3. I understand that my provider may not call in prescriptions for controlled substances to my pharmacy. 4. I understand that my provider may adjust or discontinue these medications as deemed appropriate for my medical treatment plan. This Agreement does not guarantee the prescription of controlled medications. 5. I agree that if my medications are adjusted or discontinued, I will properly dispose of any remaining medications. I understand that I will be required to dispose of any remaining controlled medications prior to being provided with any prescriptions for other controlled medications.  
 
6. I understand that the renewal of my prescription depends on my medical condition, my consistent participation, and my adherence with my treatment plan and this Agreement. 7. I understand that if I do not keep an appointment with my provider, I may not receive a renewal or refill for my controlled substance medication. PRESCRIPTION MONITORING / DRUG TESTIN. I understand that my provider may require me to provide urine, saliva or blood for testing at any time. I understand that this testing will be used to monitor for safety and adherence with my treatment plan and this Agreement. 2. I understand that my provider may ask me to provide an observed urine specimen, which means that a nurse or other health care provider may watch me provide urine, and I agree to cooperate if I am asked to provide an observed specimen. 3. I understand that if I do not provide urine, saliva or blood samples within two (2) hours of my providers request, or other timeframe decided by my provider, my treatment plan could be changed, or my prescriptions and medications may be changed or ended. 4. I understand that urine, saliva and blood test results will be a part of my permanent medical record. Initials_____ Name:.Han Mccormick :1960 MR #:[id number] Frank 17 Page 4 of 5 
 
5. I understand that my provider is required to obtain a copy of my State Prescription Monitoring Program () Report at any time in order to safely prescribe medications. 6. I will bring all of my prescribed controlled substance medications in their original bottles to all of my scheduled appointments. 7. I understand that my provider may ask me to come to the practice with all of my prescribed medications for a random pill count at any time. I agree to cooperate if I am asked to come in for a random pill count. I understand that if I do not arrive in the timeframe decided by my provider, my treatment plan could be changed, or my prescriptions and medications may be changed or ended.  
 
COOPERATION WITH INVESTIGATIONS: 
 
 1. I authorize my provider and my pharmacy to cooperate fully with any local, state, or federal law enforcement agency in the investigation of any possible misuse, sale, or other diversion of my controlled substance prescriptions or medications. RISKS: 
 
1. I understand that my level of consciousness may be affected from the use of controlled substances, and I understand that there are risks, benefits, effects and potential alternatives (including no treatment) to the medications that my provider has prescribed. 2. I understand that I may become drowsy, tired, dizzy, constipated, and sick to my stomach, or have changes in my mood or in my sleep while taking my medications. I have talked with my provider about these possible side effects, risks, benefits, and alternative treatments, and my provider has answered all of my questions. 3. I understand that I should not suddenly stop taking my medications without first speaking with my provider. I understand that if I suddenly stop taking my medications, I may experience nausea, vomiting, sweating,anxiety, sleeplessness, itching or other uncomfortable feelings. 4. I will not take my medications with alcohol of any kind, including beer, wine or liquor. I understand that drinking alcohol with my medications increases the chances of side effects, including breathing problems or even death. 5. I understand that if I have a history of alcoholism or other drug addiction I may be at increased risk of addiction to my medications. Signs of addiction might include craving, compulsive use, and continued use despite harm. Since addiction is a disease, I understand my provider may decide to change my medications and refer me to appropriate treatment services. I understand that this information would become part of my permanent medical record. Initials_____ Name:.Han Mari :1960 MR #:[id number] Frank [place]  
 Page 5 of 5  
 
 
6. Females only: Children born to women who regularly take controlled substances are likely to have physical problems and suffer withdrawal symptoms at birth. If I am of child-bearing age, I understand that I should use safe and effective birth control while taking any controlled substances to avoid the impact of medications on an unborn fetus or  child. I agree to notify my provider immediately if I should become pregnant so that my treatment plan can be adjusted. 7. Males only: I understand that chronic use of controlled substances has been associated with low testosterone levels in males which may affect my mood, stamina, sexual desire, and general health. I understand that my provider may order the appropriate laboratory test to determine my testosterone level,and I agree to this testing. ADHERENCE: 
 
1. I understand that if I do not adhere to this Agreement in any way, my provider may change my prescriptions, stop prescribing controlled substances or end our provider-patient relationship. 2. If my provider decides to stop prescribing medication, or decides to end our provider-patient relationship,my provider may require that I taper my medications slowly. If necessary, my provider may also provide a prescription for other medications to treat my withdrawal symptoms. UNDERSTANDING THIS AGREEMENT: 
 
I understand that my provider may adjust or stop my prescriptions for controlled substances based on my medical condition and my treatment plan. I understand that this Agreement does not guarantee that I will be prescribed medications or controlled substances. I understand that controlled substances may be just one part 
of my treatment plan. My initial on each page and my signature below shows that I have read each page of this Agreement, I have had an opportunity to ask questions, and all of my questions have been answered to my satisfaction by my provider. By signing below, I agree to comply with this Agreement, and I understand that if I do not follow the Agreements listed above, my provider may stop 
 
_________________________________________  Date/Time 12/15/2020 1:25 PM   
             (Patient Signature) 
 
________________________________________    Date/Time 12/15/2020 1:25 PM 
 (Parent or Guardian Signature if <18 yrs) 
 
_________________________________________ Date/Time 12/15/2020 1:25 PM 
 (Provider Signature)

## 2020-12-15 NOTE — PROGRESS NOTES
Chief Complaint   Patient presents with    Medication Evaluation     1. Have you been to the ER, urgent care clinic since your last visit? Hospitalized since your last visit? No    2. Have you seen or consulted any other health care providers outside of the [address] [place] [address] since your last visit? Include any pap smears or colon screening.  No

## 2020-12-18 LAB — DRUGS UR: NORMAL

## 2021-03-09 DIAGNOSIS — R25.1 TREMOR: ICD-10-CM

## 2021-03-09 DIAGNOSIS — F41.9 ANXIETY: ICD-10-CM

## 2021-03-09 RX ORDER — CLONAZEPAM 0.5 MG/1
TABLET ORAL
Qty: 30 TAB | Refills: 5 | Status: SHIPPED | OUTPATIENT
Start: 2021-03-09

## 2021-07-15 DIAGNOSIS — F41.9 HYPOSOMNIA, INSOMNIA OR SLEEPLESSNESS ASSOCIATED WITH ANXIETY: ICD-10-CM

## 2021-07-15 DIAGNOSIS — F51.05 HYPOSOMNIA, INSOMNIA OR SLEEPLESSNESS ASSOCIATED WITH ANXIETY: ICD-10-CM

## 2021-07-16 RX ORDER — TRAZODONE HYDROCHLORIDE 50 MG/1
TABLET ORAL
Qty: 135 TABLET | Refills: 0 | Status: SHIPPED | OUTPATIENT
Start: 2021-07-16

## 2022-03-19 PROBLEM — C20 RECTAL CANCER (HCC): Status: ACTIVE | Noted: 2017-10-02

## 2022-03-19 PROBLEM — Z79.899 CONTROLLED SUBSTANCE AGREEMENT SIGNED: Status: ACTIVE | Noted: 2017-08-21

## 2022-03-20 PROBLEM — C20 ADENOCARCINOMA OF RECTUM METASTATIC TO LIVER (HCC): Status: ACTIVE | Noted: 2018-02-06

## 2022-03-20 PROBLEM — C78.7 ADENOCARCINOMA OF RECTUM METASTATIC TO LIVER (HCC): Status: ACTIVE | Noted: 2018-02-06

## 2022-03-20 PROBLEM — I97.89: Status: ACTIVE | Noted: 2018-09-20

## 2022-03-20 PROBLEM — I47.1: Status: ACTIVE | Noted: 2018-09-20

## 2023-05-10 RX ORDER — TRAZODONE HYDROCHLORIDE 50 MG/1
TABLET ORAL
COMMUNITY
Start: 2021-07-16

## 2023-05-10 RX ORDER — ESCITALOPRAM OXALATE 20 MG/1
TABLET ORAL DAILY
COMMUNITY
Start: 2020-11-28

## 2023-05-10 RX ORDER — METOPROLOL SUCCINATE 100 MG/1
TABLET, EXTENDED RELEASE ORAL DAILY
COMMUNITY
Start: 2018-08-05

## 2023-05-10 RX ORDER — REGORAFENIB 40 MG/1
80 TABLET, FILM COATED ORAL DAILY
COMMUNITY
Start: 2020-12-03

## 2023-05-10 RX ORDER — LISINOPRIL 10 MG/1
TABLET ORAL DAILY
COMMUNITY
Start: 2018-08-05

## 2023-05-10 RX ORDER — MINOCYCLINE HYDROCHLORIDE 100 MG/1
TABLET ORAL DAILY
COMMUNITY
Start: 2020-12-10

## 2023-05-10 RX ORDER — CLONAZEPAM 0.5 MG/1
TABLET ORAL
COMMUNITY
Start: 2021-03-09

## 2023-05-10 RX ORDER — FAMOTIDINE 20 MG/1
TABLET, FILM COATED ORAL 2 TIMES DAILY
COMMUNITY

## 2023-05-10 RX ORDER — HYDROCODONE BITARTRATE AND ACETAMINOPHEN 5; 325 MG/1; MG/1
1 TABLET ORAL EVERY 6 HOURS PRN
COMMUNITY